# Patient Record
Sex: FEMALE | Race: BLACK OR AFRICAN AMERICAN | NOT HISPANIC OR LATINO | Employment: OTHER | URBAN - METROPOLITAN AREA
[De-identification: names, ages, dates, MRNs, and addresses within clinical notes are randomized per-mention and may not be internally consistent; named-entity substitution may affect disease eponyms.]

---

## 2017-10-29 PROBLEM — L98.9 SKIN LESION OF CHEST WALL: Status: ACTIVE | Noted: 2017-10-29

## 2017-10-29 PROBLEM — E55.9 VITAMIN D DEFICIENCY: Status: ACTIVE | Noted: 2017-10-29

## 2017-10-29 PROBLEM — R07.89 ATYPICAL CHEST PAIN: Status: ACTIVE | Noted: 2017-10-29

## 2017-10-29 PROBLEM — E88.819 INSULIN RESISTANCE: Status: ACTIVE | Noted: 2017-10-29

## 2017-10-29 PROBLEM — R20.0 NUMBNESS OF TOES: Status: ACTIVE | Noted: 2017-10-29

## 2017-10-29 PROBLEM — I10 ESSENTIAL HYPERTENSION, BENIGN: Status: ACTIVE | Noted: 2017-10-29

## 2017-10-29 PROBLEM — E78.5 HYPERLIPIDEMIA: Status: ACTIVE | Noted: 2017-10-29

## 2017-12-07 ENCOUNTER — HOSPITAL ENCOUNTER (OUTPATIENT)
Dept: RADIOLOGY | Facility: HOSPITAL | Age: 62
Discharge: HOME | End: 2017-12-07
Attending: INTERNAL MEDICINE
Payer: COMMERCIAL

## 2017-12-07 ENCOUNTER — OFFICE VISIT (OUTPATIENT)
Dept: PODIATRY | Facility: CLINIC | Age: 62
End: 2017-12-07
Payer: COMMERCIAL

## 2017-12-07 VITALS
BODY MASS INDEX: 33.13 KG/M2 | SYSTOLIC BLOOD PRESSURE: 120 MMHG | HEART RATE: 85 BPM | WEIGHT: 180 LBS | HEIGHT: 62 IN | DIASTOLIC BLOOD PRESSURE: 83 MMHG

## 2017-12-07 DIAGNOSIS — R07.89 ATYPICAL CHEST PAIN: ICD-10-CM

## 2017-12-07 DIAGNOSIS — R20.8 BURNING SENSATION OF TOE AND FOOT: Primary | ICD-10-CM

## 2017-12-07 PROCEDURE — 71100 X-RAY EXAM RIBS UNI 2 VIEWS: CPT | Mod: 26,,, | Performed by: RADIOLOGY

## 2017-12-07 PROCEDURE — 99203 OFFICE O/P NEW LOW 30 MIN: CPT | Mod: S$GLB,,, | Performed by: PODIATRIST

## 2017-12-07 PROCEDURE — 71100 X-RAY EXAM RIBS UNI 2 VIEWS: CPT | Mod: TC,PO

## 2017-12-07 PROCEDURE — 99999 PR PBB SHADOW E&M-NEW PATIENT-LVL III: CPT | Mod: PBBFAC,,, | Performed by: PODIATRIST

## 2017-12-07 NOTE — LETTER
December 7, 2017      Teetee Franco MD  [address] [place] [address]           [facility]  [address] [facility] [place] [address]  Phone: [phone]  Fax: [phone]          Patient: Maude Azevedo   MR Number: [id number]   YOB: 1955   Date of Visit: 12/7/2017       Dear Dr. Teetee Franco:    Thank you for referring Maude Azevedo to me for evaluation. Attached you will find relevant portions of my assessment and plan of care.    If you have questions, please do not hesitate to call me. I look forward to following Maude Azevedo along with you.    Sincerely,    Roge Howell DPM    Enclosure  CC:  No Recipients    If you would like to receive this communication electronically, please contact [email or web address] or [phone] to request more information on [facility] access.    For providers and/or their staff who would like to refer a patient to [facility], please contact us through our one-stop-shop provider referral line, [facility], at [phone].    If you feel you have received this communication in error or would no longer like to receive these types of communications, please e-mail [email or web address]

## 2017-12-07 NOTE — PROGRESS NOTES
Subjective:     Patient ID: Maude Azevedo is a 62 y.o. female.    Chief Complaint: Consult (pcp Leonides 10/23/2017) and Foot Problem (left foot numbness for months)    HPI: This 62 year old female presents today complaining of numbness and tingling of left big toe.The patient states she has numbness and tingling in the big toe for several months. Patient denies any trauma to the toe. Patient admits she did have lower back surgery 5-6 years ago and admits numbness at the inside of the left knee. Patient denies other complaints at this time.      Patient Active Problem List   Diagnosis    Numbness of toes    Atypical chest pain    Skin lesion of chest wall    Insulin resistance    Vitamin D deficiency    Hyperlipidemia    Essential hypertension, benign       Medication List with Changes/Refills   Current Medications    AMLODIPINE (NORVASC) 10 MG TABLET    Take 1 tablet (10 mg total) by mouth once daily.    BUPROPION (WELLBUTRIN XL) 150 MG TB24 TABLET    Take 1 tablet (150 mg total) by mouth once daily. Two tablets daily    CHOLECALCIFEROL, VITAMIN D3, 10,000 UNIT CAP    Take by mouth.    CLONAZEPAM (KLONOPIN) 1 MG TABLET    TK 1/2 TO 1 T PO BID PRA    CYANOCOBALAMIN (VITAMIN B-12) 1000 MCG TABLET    Take 1,000 mcg by mouth.    CYCLOBENZAPRINE (FLEXERIL) 10 MG TABLET    Take 10 mg by mouth once daily.    GABAPENTIN (NEURONTIN) 300 MG CAPSULE    Take 300 mg by mouth.    LEVOTHYROXINE (SYNTHROID) 150 MCG TABLET    Take 1 tablet (150 mcg total) by mouth once daily.    LIRAGLUTIDE 0.6 MG/0.1 ML, 18 MG/3 ML, SUBQ PNIJ (VICTOZA 3-JEN) 0.6 MG/0.1 ML (18 MG/3 ML) PNIJ    Inject 0.6 mg into the skin. 1.8 units sq q day    LISINOPRIL-HYDROCHLOROTHIAZIDE (PRINZIDE,ZESTORETIC) 20-12.5 MG PER TABLET    Take 2 tablets by mouth once daily.    MELOXICAM (MOBIC) 15 MG TABLET    TK 1 T PO D WF    METHOCARBAMOL (ROBAXIN) 500 MG TAB    Take 500 mg by mouth 3 (three) times daily. prn    MV-MN/IRON/FOLIC ACID/HERB 190 (VITAMIN  "D3 COMPLETE ORAL)    Take 10,000 Units by mouth once a week.    NEBIVOLOL (BYSTOLIC) 10 MG TAB    Take 1 tablet (10 mg total) by mouth 2 (two) times daily.    TRAZODONE (DESYREL) 100 MG TABLET    Take 100 mg by mouth once daily.       Review of patient's allergies indicates:   Allergen Reactions    Adhesive      Exfoliative dermatitis    Sulfa (sulfonamide antibiotics)        Past Surgical History:   Procedure Laterality Date    CHOLECYSTECTOMY      FRACTURE SURGERY      HYSTERECTOMY         Family History   Problem Relation Age of Onset    Breast cancer Mother     Heart disease Father     Diabetes Brother     Hypertension Brother        Social History     Social History    Marital status: Single     Spouse name: N/A    Number of children: N/A    Years of education: N/A     Occupational History    Not on file.     Social History Main Topics    Smoking status: Never Smoker    Smokeless tobacco: Never Used    Alcohol use Yes    Drug use: No    Sexual activity: Not on file     Other Topics Concern    Not on file     Social History Narrative    No narrative on file       Vitals:    12/07/17 1030   BP: 120/83   Pulse: 85   Weight: 81.6 kg (180 lb)   Height: 5' 2" (1.575 m)   PainSc: 0-No pain       Hemoglobin A1C   Date Value Ref Range Status   10/24/2017 5.2 4.8 - 5.6 % Final     Comment:              Pre-diabetes: 5.7 - 6.4           Diabetes: >6.4           Glycemic control for adults with diabetes: <7.0     03/17/2010 5.6 4.0 - 6.2 % Final   01/29/2010 5.6 4.0 - 6.2 % Final       Review of Systems   Constitutional: Negative for chills and fever.   Respiratory: Negative for shortness of breath.    Cardiovascular: Negative for chest pain, palpitations, orthopnea, claudication and leg swelling.   Gastrointestinal: Negative for diarrhea, nausea and vomiting.   Musculoskeletal: Negative for joint pain.   Skin: Negative for rash.   Neurological: Negative for dizziness, tingling, sensory change, focal " weakness and weakness.        Numbness of left big toe   Psychiatric/Behavioral: Negative.          Objective:      PHYSICAL EXAM: Apperance: Alert and orient in no distress,well developed, and with good attention to grooming and body habits  Patient presents ambulating in tennis shoes.   Lower Extremity Exam:  VASCULAR: Dorsalis pedis pulses 2/4 bilateral and Posterior Tibial pulses 2/4 bilateral. Capillary fill time <4 seconds bilateral. No edema observed bilateral. Varicosities absent bilateral. Skin temperature of the lower extremities is warm to warm, proximal to distal. Hair growth WNL bilateral.  DERMATOLOGICAL: No skin rashes, subcutaneous nodules, lesions, or ulcers observed bilateral. Nails 1,2,3,4,5 bilateral normal. Webspaces 1,2,3,4 clean, dry and without evidence of break in skin integrity bilateral.   NEUROLOGICAL: Light touch, sharp-dull, proprioception all present and equal bilaterally.  Vibratory sensation diminished at bilateral hallux. Protective sensation absent at left hallux only sites as tested with a [place] 5.07 monofilament.   MUSCULOSKELETAL: Muscle strength is 5/5 for foot inverters, everters, plantarflexors, and dorsiflexors. Muscle tone is normal. (--) pain on palpation or ROM of left hallux.        Assessment:       Encounter Diagnosis   Name Primary?    Burning sensation of toe and foot - Left Foot Yes         Plan:   Burning sensation of toe and foot - Left Foot      I counseled the patient on her conditions, regarding findings of my examination, my impressions, and usual treatment plan.   Counseled patient on her lower back history being the possible cause for abnormal toe sensation.   Discussed with patient treatments for neuropathy consisting of topical or oral medication.  Recommendations given for over-the-counter medicine such as [place] Go[place] and/or Capsaicin.  Counseled patient on daily foot inspections and proper shoe gear.  Patient to return as needed.                      Roge Howell, DPM  [facility]

## 2018-04-16 PROBLEM — K21.9 GERD (GASTROESOPHAGEAL REFLUX DISEASE): Status: ACTIVE | Noted: 2018-04-16

## 2018-04-16 PROBLEM — E89.0 HYPOTHYROIDISM, POSTSURGICAL: Status: ACTIVE | Noted: 2018-04-16

## 2018-04-16 PROBLEM — I10 ESSENTIAL HYPERTENSION: Status: ACTIVE | Noted: 2018-04-16

## 2018-04-16 PROBLEM — K21.9 HIATAL HERNIA WITH GERD: Status: ACTIVE | Noted: 2018-04-16

## 2018-04-16 PROBLEM — K44.9 HIATAL HERNIA WITH GERD: Status: ACTIVE | Noted: 2018-04-16

## 2018-11-14 PROBLEM — R06.02 SOB (SHORTNESS OF BREATH): Status: ACTIVE | Noted: 2018-11-14

## 2018-11-14 PROBLEM — R00.0 TACHYCARDIA: Status: ACTIVE | Noted: 2018-11-14

## 2019-02-14 PROBLEM — Z00.00 ANNUAL PHYSICAL EXAM: Status: ACTIVE | Noted: 2019-02-14

## 2019-02-22 PROBLEM — R31.21 ASYMPTOMATIC MICROSCOPIC HEMATURIA: Status: ACTIVE | Noted: 2019-02-22

## 2019-02-22 PROBLEM — D64.9 ANEMIA: Status: ACTIVE | Noted: 2019-02-22

## 2019-05-16 PROBLEM — M25.562 ACUTE PAIN OF LEFT KNEE: Status: ACTIVE | Noted: 2019-05-16

## 2019-05-16 PROBLEM — F51.01 PRIMARY INSOMNIA: Status: ACTIVE | Noted: 2019-05-16

## 2019-05-20 PROBLEM — Z00.00 ANNUAL PHYSICAL EXAM: Status: RESOLVED | Noted: 2019-02-14 | Resolved: 2019-05-20

## 2019-06-18 PROBLEM — S89.91XA INJURY OF RIGHT LEG: Status: ACTIVE | Noted: 2019-06-18

## 2019-06-23 PROBLEM — J30.2 SEASONAL ALLERGIC RHINITIS: Status: ACTIVE | Noted: 2019-06-23

## 2019-06-23 PROBLEM — L03.115 CELLULITIS OF RIGHT LOWER EXTREMITY: Status: ACTIVE | Noted: 2019-06-23

## 2019-06-23 PROBLEM — M25.521 PAIN IN RIGHT ELBOW: Status: ACTIVE | Noted: 2019-06-23

## 2019-07-12 ENCOUNTER — TELEPHONE (OUTPATIENT)
Dept: RADIOLOGY | Facility: HOSPITAL | Age: 64
End: 2019-07-12

## 2019-07-15 ENCOUNTER — HOSPITAL ENCOUNTER (OUTPATIENT)
Dept: RADIOLOGY | Facility: HOSPITAL | Age: 64
Discharge: HOME | End: 2019-07-15
Attending: INTERNAL MEDICINE
Payer: COMMERCIAL

## 2019-07-15 DIAGNOSIS — M25.562 ACUTE PAIN OF LEFT KNEE: ICD-10-CM

## 2019-07-15 PROCEDURE — 73718 MRI LOWER EXTREMITY W/O DYE: CPT | Mod: TC,RT

## 2019-07-15 PROCEDURE — 73718 MRI TIBIA FIBULA WITHOUT CONTRAST RIGHT: ICD-10-PCS | Mod: 26,RT,, | Performed by: RADIOLOGY

## 2019-07-15 PROCEDURE — 73718 MRI LOWER EXTREMITY W/O DYE: CPT | Mod: 26,RT,, | Performed by: RADIOLOGY

## 2019-10-23 PROBLEM — E83.52 HYPERCALCEMIA: Status: ACTIVE | Noted: 2019-10-23

## 2019-10-23 PROBLEM — E78.2 MIXED HYPERLIPIDEMIA: Status: ACTIVE | Noted: 2017-10-29

## 2019-10-23 PROBLEM — R80.9 PROTEINURIA: Status: ACTIVE | Noted: 2019-10-23

## 2020-02-18 PROBLEM — R06.02 SOB (SHORTNESS OF BREATH): Status: RESOLVED | Noted: 2018-11-14 | Resolved: 2020-02-18

## 2020-02-18 PROBLEM — M25.562 ACUTE PAIN OF LEFT KNEE: Status: RESOLVED | Noted: 2019-05-16 | Resolved: 2020-02-18

## 2020-02-18 PROBLEM — R20.0 NUMBNESS OF TOES: Status: RESOLVED | Noted: 2017-10-29 | Resolved: 2020-02-18

## 2020-02-18 PROBLEM — N39.3 STRESS INCONTINENCE: Status: ACTIVE | Noted: 2020-02-18

## 2020-02-18 PROBLEM — E83.52 HYPERCALCEMIA: Status: RESOLVED | Noted: 2019-10-23 | Resolved: 2020-02-18

## 2020-02-18 PROBLEM — J30.1 SEASONAL ALLERGIC RHINITIS DUE TO POLLEN: Status: ACTIVE | Noted: 2019-06-23

## 2020-02-18 PROBLEM — L98.9 SKIN LESION OF CHEST WALL: Status: RESOLVED | Noted: 2017-10-29 | Resolved: 2020-02-18

## 2020-02-18 PROBLEM — R00.0 TACHYCARDIA: Status: RESOLVED | Noted: 2018-11-14 | Resolved: 2020-02-18

## 2020-02-18 PROBLEM — M25.521 PAIN IN RIGHT ELBOW: Status: RESOLVED | Noted: 2019-06-23 | Resolved: 2020-02-18

## 2020-02-18 PROBLEM — I10 ESSENTIAL HYPERTENSION: Status: RESOLVED | Noted: 2018-04-16 | Resolved: 2020-02-18

## 2020-02-18 PROBLEM — L03.115 CELLULITIS OF RIGHT LOWER EXTREMITY: Status: RESOLVED | Noted: 2019-06-23 | Resolved: 2020-02-18

## 2020-02-18 PROBLEM — S89.91XA INJURY OF RIGHT LEG: Status: RESOLVED | Noted: 2019-06-18 | Resolved: 2020-02-18

## 2020-05-25 PROBLEM — Z00.00 ROUTINE GENERAL MEDICAL EXAMINATION AT A HEALTH CARE FACILITY: Status: RESOLVED | Noted: 2019-02-14 | Resolved: 2020-05-25

## 2021-09-14 PROBLEM — R92.8 ABNORMAL MAMMOGRAM: Status: ACTIVE | Noted: 2021-09-14

## 2021-09-17 ENCOUNTER — TELEPHONE (OUTPATIENT)
Dept: DERMATOLOGY | Facility: CLINIC | Age: 66
End: 2021-09-17

## 2021-10-20 ENCOUNTER — OFFICE VISIT (OUTPATIENT)
Dept: SURGERY | Facility: CLINIC | Age: 66
End: 2021-10-20
Payer: COMMERCIAL

## 2021-10-20 VITALS
HEIGHT: 62 IN | SYSTOLIC BLOOD PRESSURE: 123 MMHG | WEIGHT: 189.38 LBS | DIASTOLIC BLOOD PRESSURE: 74 MMHG | BODY MASS INDEX: 34.85 KG/M2 | HEART RATE: 93 BPM | TEMPERATURE: 98 F

## 2021-10-20 DIAGNOSIS — R92.8 ABNORMAL MAMMOGRAM: ICD-10-CM

## 2021-10-20 PROCEDURE — 3074F SYST BP LT 130 MM HG: CPT | Mod: CPTII,S$GLB,, | Performed by: SURGERY

## 2021-10-20 PROCEDURE — 3078F PR MOST RECENT DIASTOLIC BLOOD PRESSURE < 80 MM HG: ICD-10-PCS | Mod: CPTII,S$GLB,, | Performed by: SURGERY

## 2021-10-20 PROCEDURE — 1159F MED LIST DOCD IN RCRD: CPT | Mod: CPTII,S$GLB,, | Performed by: SURGERY

## 2021-10-20 PROCEDURE — 3074F PR MOST RECENT SYSTOLIC BLOOD PRESSURE < 130 MM HG: ICD-10-PCS | Mod: CPTII,S$GLB,, | Performed by: SURGERY

## 2021-10-20 PROCEDURE — 1160F RVW MEDS BY RX/DR IN RCRD: CPT | Mod: CPTII,S$GLB,, | Performed by: SURGERY

## 2021-10-20 PROCEDURE — 3078F DIAST BP <80 MM HG: CPT | Mod: CPTII,S$GLB,, | Performed by: SURGERY

## 2021-10-20 PROCEDURE — 3008F BODY MASS INDEX DOCD: CPT | Mod: CPTII,S$GLB,, | Performed by: SURGERY

## 2021-10-20 PROCEDURE — 99999 PR PBB SHADOW E&M-EST. PATIENT-LVL V: CPT | Mod: PBBFAC,,, | Performed by: SURGERY

## 2021-10-20 PROCEDURE — 4010F ACE/ARB THERAPY RXD/TAKEN: CPT | Mod: CPTII,S$GLB,, | Performed by: SURGERY

## 2021-10-20 PROCEDURE — 3008F PR BODY MASS INDEX (BMI) DOCUMENTED: ICD-10-PCS | Mod: CPTII,S$GLB,, | Performed by: SURGERY

## 2021-10-20 PROCEDURE — 99204 OFFICE O/P NEW MOD 45 MIN: CPT | Mod: S$GLB,,, | Performed by: SURGERY

## 2021-10-20 PROCEDURE — 1160F PR REVIEW ALL MEDS BY PRESCRIBER/CLIN PHARMACIST DOCUMENTED: ICD-10-PCS | Mod: CPTII,S$GLB,, | Performed by: SURGERY

## 2021-10-20 PROCEDURE — 99204 PR OFFICE/OUTPT VISIT, NEW, LEVL IV, 45-59 MIN: ICD-10-PCS | Mod: S$GLB,,, | Performed by: SURGERY

## 2021-10-20 PROCEDURE — 4010F PR ACE/ARB THEARPY RXD/TAKEN: ICD-10-PCS | Mod: CPTII,S$GLB,, | Performed by: SURGERY

## 2021-10-20 PROCEDURE — 99999 PR PBB SHADOW E&M-EST. PATIENT-LVL V: ICD-10-PCS | Mod: PBBFAC,,, | Performed by: SURGERY

## 2021-10-20 PROCEDURE — 1159F PR MEDICATION LIST DOCUMENTED IN MEDICAL RECORD: ICD-10-PCS | Mod: CPTII,S$GLB,, | Performed by: SURGERY

## 2021-11-03 ENCOUNTER — OFFICE VISIT (OUTPATIENT)
Dept: DERMATOLOGY | Facility: CLINIC | Age: 66
End: 2021-11-03
Payer: COMMERCIAL

## 2021-11-03 DIAGNOSIS — D22.9 NEVUS: ICD-10-CM

## 2021-11-03 DIAGNOSIS — L82.1 DERMATOSIS PAPULOSA NIGRA: ICD-10-CM

## 2021-11-03 DIAGNOSIS — L91.8 INFLAMED SKIN TAG: ICD-10-CM

## 2021-11-03 DIAGNOSIS — L91.8 ACROCHORDON: ICD-10-CM

## 2021-11-03 DIAGNOSIS — L82.1 SEBORRHEIC KERATOSIS: Primary | ICD-10-CM

## 2021-11-03 PROCEDURE — 4010F ACE/ARB THERAPY RXD/TAKEN: CPT | Mod: CPTII,S$GLB,, | Performed by: DERMATOLOGY

## 2021-11-03 PROCEDURE — 1160F RVW MEDS BY RX/DR IN RCRD: CPT | Mod: CPTII,S$GLB,, | Performed by: DERMATOLOGY

## 2021-11-03 PROCEDURE — 99202 OFFICE O/P NEW SF 15 MIN: CPT | Mod: S$GLB,,, | Performed by: DERMATOLOGY

## 2021-11-03 PROCEDURE — 3288F FALL RISK ASSESSMENT DOCD: CPT | Mod: CPTII,S$GLB,, | Performed by: DERMATOLOGY

## 2021-11-03 PROCEDURE — 1159F PR MEDICATION LIST DOCUMENTED IN MEDICAL RECORD: ICD-10-PCS | Mod: CPTII,S$GLB,, | Performed by: DERMATOLOGY

## 2021-11-03 PROCEDURE — 4010F PR ACE/ARB THEARPY RXD/TAKEN: ICD-10-PCS | Mod: CPTII,S$GLB,, | Performed by: DERMATOLOGY

## 2021-11-03 PROCEDURE — 1160F PR REVIEW ALL MEDS BY PRESCRIBER/CLIN PHARMACIST DOCUMENTED: ICD-10-PCS | Mod: CPTII,S$GLB,, | Performed by: DERMATOLOGY

## 2021-11-03 PROCEDURE — 1101F PT FALLS ASSESS-DOCD LE1/YR: CPT | Mod: CPTII,S$GLB,, | Performed by: DERMATOLOGY

## 2021-11-03 PROCEDURE — 99999 PR PBB SHADOW E&M-EST. PATIENT-LVL IV: CPT | Mod: PBBFAC,,, | Performed by: DERMATOLOGY

## 2021-11-03 PROCEDURE — 99202 PR OFFICE/OUTPT VISIT, NEW, LEVL II, 15-29 MIN: ICD-10-PCS | Mod: S$GLB,,, | Performed by: DERMATOLOGY

## 2021-11-03 PROCEDURE — 1101F PR PT FALLS ASSESS DOC 0-1 FALLS W/OUT INJ PAST YR: ICD-10-PCS | Mod: CPTII,S$GLB,, | Performed by: DERMATOLOGY

## 2021-11-03 PROCEDURE — 3288F PR FALLS RISK ASSESSMENT DOCUMENTED: ICD-10-PCS | Mod: CPTII,S$GLB,, | Performed by: DERMATOLOGY

## 2021-11-03 PROCEDURE — 99999 PR PBB SHADOW E&M-EST. PATIENT-LVL IV: ICD-10-PCS | Mod: PBBFAC,,, | Performed by: DERMATOLOGY

## 2021-11-03 PROCEDURE — 1159F MED LIST DOCD IN RCRD: CPT | Mod: CPTII,S$GLB,, | Performed by: DERMATOLOGY

## 2022-04-28 ENCOUNTER — OFFICE VISIT (OUTPATIENT)
Dept: SURGERY | Facility: CLINIC | Age: 67
End: 2022-04-28
Payer: COMMERCIAL

## 2022-04-28 VITALS
HEART RATE: 87 BPM | BODY MASS INDEX: 33.91 KG/M2 | SYSTOLIC BLOOD PRESSURE: 130 MMHG | DIASTOLIC BLOOD PRESSURE: 82 MMHG | TEMPERATURE: 97 F | WEIGHT: 185.44 LBS

## 2022-04-28 DIAGNOSIS — R92.8 ABNORMAL MAMMOGRAM: Primary | ICD-10-CM

## 2022-04-28 PROCEDURE — 1160F RVW MEDS BY RX/DR IN RCRD: CPT | Mod: CPTII,S$GLB,, | Performed by: SURGERY

## 2022-04-28 PROCEDURE — 1126F AMNT PAIN NOTED NONE PRSNT: CPT | Mod: CPTII,S$GLB,, | Performed by: SURGERY

## 2022-04-28 PROCEDURE — 1159F MED LIST DOCD IN RCRD: CPT | Mod: CPTII,S$GLB,, | Performed by: SURGERY

## 2022-04-28 PROCEDURE — 99999 PR PBB SHADOW E&M-EST. PATIENT-LVL V: ICD-10-PCS | Mod: PBBFAC,,, | Performed by: SURGERY

## 2022-04-28 PROCEDURE — 3079F PR MOST RECENT DIASTOLIC BLOOD PRESSURE 80-89 MM HG: ICD-10-PCS | Mod: CPTII,S$GLB,, | Performed by: SURGERY

## 2022-04-28 PROCEDURE — 1126F PR PAIN SEVERITY QUANTIFIED, NO PAIN PRESENT: ICD-10-PCS | Mod: CPTII,S$GLB,, | Performed by: SURGERY

## 2022-04-28 PROCEDURE — 99214 PR OFFICE/OUTPT VISIT, EST, LEVL IV, 30-39 MIN: ICD-10-PCS | Mod: S$GLB,,, | Performed by: SURGERY

## 2022-04-28 PROCEDURE — 3008F PR BODY MASS INDEX (BMI) DOCUMENTED: ICD-10-PCS | Mod: CPTII,S$GLB,, | Performed by: SURGERY

## 2022-04-28 PROCEDURE — 1160F PR REVIEW ALL MEDS BY PRESCRIBER/CLIN PHARMACIST DOCUMENTED: ICD-10-PCS | Mod: CPTII,S$GLB,, | Performed by: SURGERY

## 2022-04-28 PROCEDURE — 99999 PR PBB SHADOW E&M-EST. PATIENT-LVL V: CPT | Mod: PBBFAC,,, | Performed by: SURGERY

## 2022-04-28 PROCEDURE — 3075F PR MOST RECENT SYSTOLIC BLOOD PRESS GE 130-139MM HG: ICD-10-PCS | Mod: CPTII,S$GLB,, | Performed by: SURGERY

## 2022-04-28 PROCEDURE — 3079F DIAST BP 80-89 MM HG: CPT | Mod: CPTII,S$GLB,, | Performed by: SURGERY

## 2022-04-28 PROCEDURE — 1159F PR MEDICATION LIST DOCUMENTED IN MEDICAL RECORD: ICD-10-PCS | Mod: CPTII,S$GLB,, | Performed by: SURGERY

## 2022-04-28 PROCEDURE — 3075F SYST BP GE 130 - 139MM HG: CPT | Mod: CPTII,S$GLB,, | Performed by: SURGERY

## 2022-04-28 PROCEDURE — 3008F BODY MASS INDEX DOCD: CPT | Mod: CPTII,S$GLB,, | Performed by: SURGERY

## 2022-04-28 PROCEDURE — 99214 OFFICE O/P EST MOD 30 MIN: CPT | Mod: S$GLB,,, | Performed by: SURGERY

## 2022-05-04 ENCOUNTER — HOSPITAL ENCOUNTER (OUTPATIENT)
Dept: RADIOLOGY | Facility: HOSPITAL | Age: 67
Discharge: HOME | End: 2022-05-04
Attending: SURGERY
Payer: COMMERCIAL

## 2022-05-04 ENCOUNTER — HOSPITAL ENCOUNTER (OUTPATIENT)
Dept: RADIOLOGY | Facility: HOSPITAL | Age: 67
Discharge: HOME | End: 2022-05-04
Payer: COMMERCIAL

## 2022-05-04 DIAGNOSIS — R92.8 ABNORMAL MAMMOGRAM: ICD-10-CM

## 2022-05-04 PROCEDURE — 77061 MAMMO DIGITAL DIAGNOSTIC LEFT WITH TOMO: ICD-10-PCS | Mod: 26,LT,, | Performed by: RADIOLOGY

## 2022-05-04 PROCEDURE — 77065 DX MAMMO INCL CAD UNI: CPT | Mod: 26,LT,, | Performed by: RADIOLOGY

## 2022-05-04 PROCEDURE — 77061 BREAST TOMOSYNTHESIS UNI: CPT | Mod: 26,LT,, | Performed by: RADIOLOGY

## 2022-05-04 PROCEDURE — 77065 MAMMO DIGITAL DIAGNOSTIC LEFT WITH TOMO: ICD-10-PCS | Mod: 26,LT,, | Performed by: RADIOLOGY

## 2022-05-04 PROCEDURE — 77065 DX MAMMO INCL CAD UNI: CPT | Mod: TC,LT

## 2022-05-04 NOTE — PROGRESS NOTES
History & Physical    SUBJECTIVE:     History of Present Illness:  Patient is a 66 y.o. female presents for 6 month interval left breast exam and breast imaging.  In the interim she denies any new breast masses or breast changes.      Initially referred for second opinion of recent mammogram/biopsy. She recently underwent imaging and was noted to have abnormal mammogram/ultrasound of left breast. She was noted to have mass in lower outer region and axilla. She underwent core biopsy of these and pathology showed benign intramammary lymph node and benign axillary lymph node.     No chief complaint on file.      Review of patient's allergies indicates:   Allergen Reactions    Ace inhibitors     Adhesive      Exfoliative dermatitis    Levothyroxine      Branded synthroid only    Sulfa (sulfonamide antibiotics) Swelling       Current Outpatient Medications   Medication Sig Dispense Refill    albuterol (PROVENTIL/VENTOLIN HFA) 90 mcg/actuation inhaler Inhale 2 puffs into the lungs.      amLODIPine (NORVASC) 10 MG tablet TAKE 1 TABLET(10 MG) BY MOUTH EVERY DAY 90 tablet 1    aspirin (ECOTRIN) 81 MG EC tablet Take 81 mg by mouth.      buPROPion (WELLBUTRIN XL) 150 MG TB24 tablet TAKE 2 TABLETS BY MOUTH EVERY  tablet 0    cholecalciferol, vitamin D3, (DIALYVITE VITAMIN D3 MAX) 1,250 mcg (50,000 unit) Tab Take 1 tablet by mouth once a week. 12 tablet 1    clonazePAM (KLONOPIN) 1 MG tablet       cloNIDine (CATAPRES) 0.1 MG tablet TAKE 1 TABLET(0.1 MG) BY MOUTH EVERY EVENING 90 tablet 1    FREESTYLE LITE STRIPS Strp USE TO TEST BLOOD GLUCOSE EVERY DAY 50 strip 2    liraglutide 0.6 mg/0.1 mL, 18 mg/3 mL, subq PNIJ (VICTOZA 3-JEN) 0.6 mg/0.1 mL (18 mg/3 mL) PnIj pen INSTILL 1.8 UNITS BY SUBCUTEANOUS ROUTE DAILY 9 mL 1    lisinopriL (PRINIVIL,ZESTRIL) 20 MG tablet Take by mouth.      losartan-hydrochlorothiazide 100-25 mg (HYZAAR) 100-25 mg per tablet Take 1 tablet by mouth once daily. 90 tablet 1     meloxicam (MOBIC) 7.5 MG tablet Take 1 tablet (7.5 mg total) by mouth once daily. 30 tablet 2    montelukast (SINGULAIR) 10 mg tablet TAKE 1 TABLET(10 MG) BY MOUTH EVERY EVENING 30 tablet 11    pen needle, diabetic (PEN NEEDLE) 29 gauge Ndle To inj once daily 100 each 1    SYNTHROID 175 mcg tablet TAKE 1 TABLET(175 MCG) BY MOUTH BEFORE BREAKFAST 30 tablet 11    SYNTHROID 175 mcg tablet TAKE 1 TABLET(175 MCG) BY MOUTH BEFORE BREAKFAST 30 tablet 11    traZODone (DESYREL) 100 MG tablet TAKE 2 TABLETS BY MOUTH EVERY NIGHT AS NEEDED      ALLEGRA-D 24 HOUR 180-240 mg per 24 hr tablet Take by mouth.      ALPRAZolam (XANAX) 0.5 MG tablet Take by mouth.      atorvastatin (LIPITOR) 40 MG tablet Take by mouth.      methocarbamoL (ROBAXIN) 750 MG Tab Take 750 mg by mouth.      mupirocin (BACTROBAN) 2 % ointment Apply to affected area on R lower leg 3 times a day as needed (Patient not taking: Reported on 4/28/2022) 15 g 1    pantoprazole (PROTONIX) 40 MG tablet Take by mouth.      rosuvastatin (CRESTOR) 5 MG tablet Take 5 mg by mouth once daily.      traMADoL (ULTRAM) 50 mg tablet        No current facility-administered medications for this visit.       Past Medical History:   Diagnosis Date    Adult general medical examination 12/23/2016    Anxiety     Congestive heart failure     Coronary atherosclerosis     Depression     GERD (gastroesophageal reflux disease)     Hyperlipidemia     Hypertension     Hypothyroidism     Insomnia     Long term current use of therapeutic drug     Menopause     Metabolic syndrome X     Sleep apnea     Vitamin D deficiency      Past Surgical History:   Procedure Laterality Date    CHOLECYSTECTOMY      HYSTERECTOMY      SPINE SURGERY      THYROIDECTOMY      post thyroid cancer     Family History   Problem Relation Age of Onset    Breast cancer Mother     Heart disease Father     Diabetes Brother     Hypertension Brother      Social History     Tobacco Use     Smoking status: Never Smoker    Smokeless tobacco: Never Used   Substance Use Topics    Alcohol use: Yes    Drug use: No        Review of Systems:  Review of Systems   Constitutional: Negative for activity change, appetite change, chills, diaphoresis, fatigue, fever and unexpected weight change.   HENT: Negative for congestion, dental problem, hearing loss, rhinorrhea, sore throat and trouble swallowing.    Eyes: Negative for discharge and visual disturbance.   Respiratory: Negative for cough, chest tightness, shortness of breath and wheezing.    Cardiovascular: Negative for chest pain, palpitations and leg swelling.   Gastrointestinal: Negative for abdominal distention, abdominal pain, blood in stool, constipation, diarrhea, nausea and vomiting.   Endocrine: Negative for cold intolerance, heat intolerance, polydipsia, polyphagia and polyuria.   Genitourinary: Negative for difficulty urinating, dysuria, frequency, hematuria, menstrual problem and urgency.   Musculoskeletal: Negative for arthralgias, gait problem, joint swelling, myalgias and neck pain.   Skin: Negative for color change, pallor, rash and wound.   Neurological: Negative for dizziness, syncope, weakness, light-headedness, numbness and headaches.   Hematological: Negative for adenopathy. Does not bruise/bleed easily.   Psychiatric/Behavioral: Negative for confusion, decreased concentration, dysphoric mood and sleep disturbance. The patient is not nervous/anxious.        OBJECTIVE:     Vital Signs (Most Recent)  Temp: 96.7 °F (35.9 °C) (04/28/22 1519)  Pulse: 87 (04/28/22 1519)  BP: 130/82 (04/28/22 1519)     84.1 kg (185 lb 6.5 oz)     Physical Exam:  Physical Exam  Vitals reviewed.   Constitutional:       General: She is not in acute distress.     Appearance: She is well-developed. She is not diaphoretic.   HENT:      Head: Normocephalic and atraumatic.      Right Ear: External ear normal.      Left Ear: External ear normal.   Eyes:      General: No  scleral icterus.     Conjunctiva/sclera: Conjunctivae normal.      Pupils: Pupils are equal, round, and reactive to light.   Neck:      Thyroid: No thyromegaly.      Trachea: No tracheal deviation.   Cardiovascular:      Rate and Rhythm: Normal rate and regular rhythm.      Heart sounds: Normal heart sounds. No murmur heard.    No friction rub. No gallop.   Pulmonary:      Effort: Pulmonary effort is normal. No respiratory distress.      Breath sounds: Normal breath sounds. No wheezing or rales.   Chest:      Chest wall: No tenderness.   Breasts:      Right: No inverted nipple, mass, nipple discharge, skin change or tenderness.      Left: No inverted nipple, mass, nipple discharge, skin change or tenderness.       Abdominal:      General: Bowel sounds are normal. There is no distension.      Palpations: Abdomen is soft.      Tenderness: There is no abdominal tenderness.      Hernia: No hernia is present.   Musculoskeletal:         General: No tenderness or deformity. Normal range of motion.      Cervical back: Normal range of motion and neck supple.   Lymphadenopathy:      Cervical: No cervical adenopathy.   Skin:     General: Skin is warm and dry.      Coloration: Skin is not pale.      Findings: No erythema or rash.   Neurological:      Mental Status: She is alert and oriented to person, place, and time.   Psychiatric:         Behavior: Behavior normal.         Thought Content: Thought content normal.         Judgment: Judgment normal.                       ASSESSMENT/PLAN:     65 y/o female with previously abnormal left breast mammogram and subsequent biopsy, normal breast exam today    PLAN:Plan     Schedule for 6 month interval breast imaging  Call patient with results  Breast exam normal today    Hypertension stable/monitor/continue medical management with antihypertensives  Hyperlipidemia stable/dietary modifications/statin therapy  Hypothyroidism continue medical management with thyroid replacement  medication

## 2022-05-23 ENCOUNTER — PATIENT MESSAGE (OUTPATIENT)
Dept: SURGERY | Facility: HOSPITAL | Age: 67
End: 2022-05-23
Payer: COMMERCIAL

## 2023-06-02 PROBLEM — N32.81 OAB (OVERACTIVE BLADDER): Status: ACTIVE | Noted: 2023-02-16

## 2024-06-06 ENCOUNTER — OFFICE VISIT (OUTPATIENT)
Dept: DERMATOLOGY | Facility: CLINIC | Age: 69
End: 2024-06-06
Payer: COMMERCIAL

## 2024-06-06 VITALS — WEIGHT: 186.75 LBS | BODY MASS INDEX: 34.37 KG/M2 | HEIGHT: 62 IN

## 2024-06-06 DIAGNOSIS — D22.9 NEVUS: ICD-10-CM

## 2024-06-06 DIAGNOSIS — L82.1 SEBORRHEIC KERATOSES: Primary | ICD-10-CM

## 2024-06-06 DIAGNOSIS — L82.0 INFLAMED SEBORRHEIC KERATOSIS: ICD-10-CM

## 2024-06-06 PROCEDURE — 3060F POS MICROALBUMINURIA REV: CPT | Mod: CPTII,S$GLB,, | Performed by: STUDENT IN AN ORGANIZED HEALTH CARE EDUCATION/TRAINING PROGRAM

## 2024-06-06 PROCEDURE — 3288F FALL RISK ASSESSMENT DOCD: CPT | Mod: CPTII,S$GLB,, | Performed by: STUDENT IN AN ORGANIZED HEALTH CARE EDUCATION/TRAINING PROGRAM

## 2024-06-06 PROCEDURE — 3044F HG A1C LEVEL LT 7.0%: CPT | Mod: CPTII,S$GLB,, | Performed by: STUDENT IN AN ORGANIZED HEALTH CARE EDUCATION/TRAINING PROGRAM

## 2024-06-06 PROCEDURE — 99999 PR PBB SHADOW E&M-EST. PATIENT-LVL V: CPT | Mod: PBBFAC,,, | Performed by: STUDENT IN AN ORGANIZED HEALTH CARE EDUCATION/TRAINING PROGRAM

## 2024-06-06 PROCEDURE — 3008F BODY MASS INDEX DOCD: CPT | Mod: CPTII,S$GLB,, | Performed by: STUDENT IN AN ORGANIZED HEALTH CARE EDUCATION/TRAINING PROGRAM

## 2024-06-06 PROCEDURE — 1101F PT FALLS ASSESS-DOCD LE1/YR: CPT | Mod: CPTII,S$GLB,, | Performed by: STUDENT IN AN ORGANIZED HEALTH CARE EDUCATION/TRAINING PROGRAM

## 2024-06-06 PROCEDURE — 1126F AMNT PAIN NOTED NONE PRSNT: CPT | Mod: CPTII,S$GLB,, | Performed by: STUDENT IN AN ORGANIZED HEALTH CARE EDUCATION/TRAINING PROGRAM

## 2024-06-06 PROCEDURE — 1160F RVW MEDS BY RX/DR IN RCRD: CPT | Mod: CPTII,S$GLB,, | Performed by: STUDENT IN AN ORGANIZED HEALTH CARE EDUCATION/TRAINING PROGRAM

## 2024-06-06 PROCEDURE — 1159F MED LIST DOCD IN RCRD: CPT | Mod: CPTII,S$GLB,, | Performed by: STUDENT IN AN ORGANIZED HEALTH CARE EDUCATION/TRAINING PROGRAM

## 2024-06-06 PROCEDURE — 17110 DESTRUCTION B9 LES UP TO 14: CPT | Mod: S$GLB,,, | Performed by: STUDENT IN AN ORGANIZED HEALTH CARE EDUCATION/TRAINING PROGRAM

## 2024-06-06 PROCEDURE — 3066F NEPHROPATHY DOC TX: CPT | Mod: CPTII,S$GLB,, | Performed by: STUDENT IN AN ORGANIZED HEALTH CARE EDUCATION/TRAINING PROGRAM

## 2024-06-06 PROCEDURE — 99213 OFFICE O/P EST LOW 20 MIN: CPT | Mod: 25,S$GLB,, | Performed by: STUDENT IN AN ORGANIZED HEALTH CARE EDUCATION/TRAINING PROGRAM

## 2024-06-06 NOTE — PROGRESS NOTES
Subjective:       Patient ID:  Maude Azevedo is a 68 y.o. female who presents for   Chief Complaint   Patient presents with    Mole     History of Present Illness: The patient presents with chief complaint of an irritated growth on the skin.  Location: left chest, right abdomen/flank area  Duration: both present for several months  Signs/Symptoms: reports on both sides have a brownish, crusted, itchy and irritated growth  Prior treatments: none      Mole        Review of Systems   Constitutional:  Negative for fever and chills.   Skin:  Negative for itching, rash and dry skin.        Objective:    Physical Exam   Constitutional: She appears well-developed and well-nourished. No distress.   Neurological: She is alert and oriented to person, place, and time. She is not disoriented.   Psychiatric: She has a normal mood and affect.   Skin:   Areas Examined (abnormalities noted in diagram):   Head / Face Inspection Performed  Neck Inspection Performed  Chest / Axilla Inspection Performed  Abdomen Inspection Performed  Back Inspection Performed  RUE Inspected              Diagram Legend     Erythematous scaling macule/papule c/w actinic keratosis       Vascular papule c/w angioma      Pigmented verrucoid papule/plaque c/w seborrheic keratosis      Yellow umbilicated papule c/w sebaceous hyperplasia      Irregularly shaped tan macule c/w lentigo     1-2 mm smooth white papules consistent with Milia      Movable subcutaneous cyst with punctum c/w epidermal inclusion cyst      Subcutaneous movable cyst c/w pilar cyst      Firm pink to brown papule c/w dermatofibroma      Pedunculated fleshy papule(s) c/w skin tag(s)      Evenly pigmented macule c/w junctional nevus     Mildly variegated pigmented, slightly irregular-bordered macule c/w mildly atypical nevus      Flesh colored to evenly pigmented papule c/w intradermal nevus       Pink pearly papule/plaque c/w basal cell carcinoma      Erythematous hyperkeratotic  cursted plaque c/w SCC      Surgical scar with no sign of skin cancer recurrence      Open and closed comedones      Inflammatory papules and pustules      Verrucoid papule consistent consistent with wart     Erythematous eczematous patches and plaques     Dystrophic onycholytic nail with subungual debris c/w onychomycosis     Umbilicated papule    Erythematous-base heme-crusted tan verrucoid plaque consistent with inflamed seborrheic keratosis     Erythematous Silvery Scaling Plaque c/w Psoriasis     See annotation      Assessment / Plan:        Seborrheic keratoses  These are benign inherited growths without a malignant potential. Reassurance given to patient. No treatment is necessary.     Inflamed seborrheic keratosis  Cryosurgery procedure note:    Verbal consent from the patient is obtained including, but not limited to, risk of hypopigmentation/hyperpigmentation, scar, recurrence of lesion. Liquid nitrogen cryosurgery is applied to 2 lesions to produce a freeze injury. The patient is aware that blisters may form and is instructed on wound care with gentle cleansing and use of vaseline ointment to keep moist until healed. The patient is supplied a handout on cryosurgery and is instructed to call if lesions do not completely resolve.             Follow up if symptoms worsen or fail to improve.

## 2025-01-10 ENCOUNTER — HOSPITAL ENCOUNTER (OUTPATIENT)
Dept: RADIOLOGY | Facility: HOSPITAL | Age: 70
Discharge: HOME | End: 2025-01-10
Attending: INTERNAL MEDICINE
Payer: COMMERCIAL

## 2025-01-10 DIAGNOSIS — M79.605 CHRONIC PAIN OF LEFT LOWER EXTREMITY: ICD-10-CM

## 2025-01-10 DIAGNOSIS — G89.29 CHRONIC PAIN OF LEFT LOWER EXTREMITY: ICD-10-CM

## 2025-01-10 PROBLEM — E66.01 SEVERE OBESITY (BMI 35.0-39.9) WITH COMORBIDITY: Status: ACTIVE | Noted: 2025-01-10

## 2025-01-10 PROCEDURE — 93971 EXTREMITY STUDY: CPT | Mod: 26,LT,, | Performed by: RADIOLOGY

## 2025-01-10 PROCEDURE — 93971 EXTREMITY STUDY: CPT | Mod: TC,LT

## 2025-01-18 PROBLEM — M17.12 PRIMARY OSTEOARTHRITIS OF LEFT KNEE: Status: ACTIVE | Noted: 2025-01-18

## 2025-01-24 PROBLEM — N60.12 DIFFUSE CYSTIC MASTOPATHY OF LEFT BREAST: Status: ACTIVE | Noted: 2025-01-24

## 2025-01-24 PROBLEM — N64.52 DISCHARGE FROM LEFT NIPPLE: Status: ACTIVE | Noted: 2025-01-24

## 2025-01-24 NOTE — ASSESSMENT & PLAN NOTE
Her discharge is actually clear. I will obtain a left us and galactogram then  make further recommendations. She understands the need to obtain a galactogram so that the area of concern (probable papilloma) can be localized. She understands that there is about a 90% chance that her discharge is being caused by a benign etiology. She also understands that there is a 10% chance that this could be a cancer causing her discharge. She understands that SubAreolar Resection (ductal excision) may be necessary. R/B/I and alternatives were discussed with her. I will obtain an ultrasound and a galactogram then make further recommendations pending her finalized imaging results.

## 2025-01-24 NOTE — PROGRESS NOTES
[facility]  MD Lashae Madden, NP-C        Date of Service: 1/27/2025    Chief Complaint:   Maude Azevedo is a 69 y.o. female presenting today due to a clear left nipple discharge. She first noticed this discharge  around August. She reports this discharge is spontaneous.  Her Prolactin level and thyroid level were normal 1/10/2025. Her BUN/ Creat was normal 10/9/2024.    History of Present Illness:   Mrs. Maude Azevedo presents on January 27, 2025 due to a clear left nipple discharge.  She reports no abnormal findings on her last breast imaging.  MD:::Teetee Franco MD    Past Medical History:   Diagnosis Date    Adult general medical examination 12/23/2016    Anxiety     Congestive heart failure     Coronary atherosclerosis     Depression     Diffuse cystic mastopathy of left breast     Discharge from left nipple 01/24/2025    GERD (gastroesophageal reflux disease)     Hyperlipidemia     Hypertension     Hypothyroidism     Insomnia     Long term current use of therapeutic drug     Mass of lower outer quadrant of left breast     b-9 intramammary LN on core 10/6/2021    Menopause     Metabolic syndrome X     Sleep apnea     Vitamin D deficiency       Past Surgical History:   Procedure Laterality Date    CHOLECYSTECTOMY      HYSTERECTOMY      left breast ultrasound guuided biopsy 3- 4 o'clock mass and left axillary LN 10/6/2021      SPINE SURGERY      THYROIDECTOMY      post thyroid cancer        Current Outpatient Medications:     albuterol (PROVENTIL/VENTOLIN HFA) 90 mcg/actuation inhaler, Inhale 2 puffs into the lungs every 6 (six) hours as needed for Wheezing., Disp: 18 g, Rfl: 2    amLODIPine (NORVASC) 10 MG tablet, Take 1 tablet (10 mg total) by mouth once daily., Disp: 90 tablet, Rfl: 1    aspirin (ECOTRIN) 81 MG EC tablet, Take 81 mg by mouth., Disp: , Rfl:     cholecalciferol, vitamin D3, (VITAMIN D3) 250 mcg (10,000 unit) Cap capsule,  Take 2 capsules (20,000 Units total) by mouth once a week., Disp: 24 capsule, Rfl: 1    cloNIDine (CATAPRES) 0.1 MG tablet, , Disp: , Rfl:     empagliflozin (JARDIANCE) 10 mg tablet, Take 1 tablet (10 mg total) by mouth once daily., Disp: 90 tablet, Rfl: 1    hydrOXYzine pamoate (VISTARIL) 25 MG Cap, TAKE 1 TO 2 CAPSULES BY MOUTH TWICE DAILY AS NEEDED FOR ANXIETY, Disp: , Rfl:     loratadine (CLARITIN) 10 mg tablet, Take 1 tablet (10 mg total) by mouth once daily., Disp: 90 tablet, Rfl: 1    meloxicam (MOBIC) 15 MG tablet, Take 1 tablet (15 mg total) by mouth daily as needed for Pain., Disp: 30 tablet, Rfl: 1    montelukast (SINGULAIR) 10 mg tablet, TAKE 1 TABLET(10 MG) BY MOUTH EVERY EVENING, Disp: 90 tablet, Rfl: 1    mupirocin (BACTROBAN) 2 % ointment, Apply topically 3 (three) times daily., Disp: 30 g, Rfl: 2    nystatin (MYCOSTATIN) powder, Apply topically 4 (four) times daily., Disp: 60 g, Rfl: 2    pantoprazole (PROTONIX) 40 MG tablet, Take 1 tablet (40 mg total) by mouth once daily., Disp: 90 tablet, Rfl: 1    sertraline (ZOLOFT) 25 MG tablet, Take 25 mg by mouth., Disp: , Rfl:     SYNTHROID 137 mcg Tab tablet, Take 1 tablet (137 mcg total) by mouth before breakfast., Disp: 90 tablet, Rfl: 1    traZODone (DESYREL) 100 MG tablet, TAKE 2 TABLETS BY MOUTH EVERY NIGHT AS NEEDED, Disp: 90 tablet, Rfl: 1    BLACK COHOSH ORAL, Take 40 mg by mouth as needed., Disp: , Rfl:     ciclopirox (PENLAC) 8 % Soln, Apply topically nightly. (Patient not taking: Reported on 1/27/2025), Disp: 6.6 mL, Rfl: 2    multivitamin with minerals tablet, Take 1 tablet by mouth once daily., Disp: , Rfl:    Review of patient's allergies indicates:   Allergen Reactions    Ace inhibitors     Adhesive      Exfoliative dermatitis    Levothyroxine      Branded synthroid only    Sulfa (sulfonamide antibiotics) Swelling      Social History     Tobacco Use    Smoking status: Never    Smokeless tobacco: Never   Substance Use Topics    Alcohol use:  Yes      Family History   Problem Relation Name Age of Onset    Breast cancer Mother  78    Heart disease Father      Diabetes Brother      Hypertension Brother      Cancer Daughter      Ovarian cancer Neg Hx          Review of Systems   Integumentary:  Positive for breast discharge. Negative for color change, rash, mole/lesion, breast mass and breast tenderness.   Breast: Negative for mass and tenderness       Physical Exam   Constitutional: She appears well-developed. She is cooperative.   HENT:   Head: Normocephalic.   Cardiovascular:  Normal rate and regular rhythm.            Pulmonary/Chest: She exhibits no tenderness and no bony tenderness. Right breast exhibits no mass, no nipple discharge, no skin change and no tenderness. Left breast exhibits nipple discharge (clear discharge noted). Left breast exhibits no mass, no skin change and no tenderness.       Abdominal: Soft. Normal appearance.   Musculoskeletal: Lymphadenopathy:      Upper Body:      Right upper body: No supraclavicular or axillary adenopathy.      Left upper body: No supraclavicular or axillary adenopathy.     Neurological: She is alert.   Skin: No rash noted.          MAMMOGRAM REPORT: 11/8/2024 Right additional views:  The breast tissue is predominantly fatty. Additional digital mammographic images of the right breast reveal no evidence of mass or architectural distortion. Area of asymmetry within the upper right breast demonstrated on the screening MLO view did not persist on the multiple additional views.  Screening 10/21/2024-  There are scattered fibroglandular elements noted. An asymmetry is seen in the upper right breast at posterior depth on MLO view only. No change of   the left breast. Impression: Right breast asymmetry for which further mammographic evaluation to   include true lateral and spot compression views are warranted. BIRADS 0: Incomplete: Need additional imaging evaluation     ULTRASOUND REPORT:  Will obtain      Galactogram: Left- Will obtain today    NOTE:::We viewed her films together at today's visit.  We discussed the multiple views obtained and the important findings.  Even benign changes were mentioned and her questions were answered.  She knows that she may receive a formal letter or report from the Radiologist.  She is to contact us if she has questions.    ASSESSMENT and PLAN OF CARE     1. Discharge from left nipple  Assessment & Plan:  Her discharge is actually clear. I will obtain a left us and galactogram then  make further recommendations. She understands the need to obtain a galactogram so that the area of concern (probable papilloma) can be localized. She understands that there is about a 90% chance that her discharge is being caused by a benign etiology. She also understands that there is a 10% chance that this could be a cancer causing her discharge. She understands that SubAreolar Resection (ductal excision) may be necessary. R/B/I and alternatives were discussed with her. I will obtain an ultrasound and a galactogram then make further recommendations pending her finalized imaging results.       2. Mass of lower outer quadrant of left breast  Assessment & Plan:  We reviewed our findings today and her questions were answered.  She understands that her imaging and exams have remained stable (and show nothing concerning).  She is comfortable being followed in a conservative fashion.      She understands the importance of monthly self-breast examination and knows to report any and all changes as they occur.    NOTE:::We viewed her films together at today's visit.  We discussed the multiple views obtained and the important findings.  Even benign changes were mentioned and her questions were answered.  She is to contact us if she has questions.        3. Diffuse cystic mastopathy of left breast  Assessment & Plan:  We discussed our Fibrocystic Mastopathy Protocol in detail. She should take Vitamin E 800 IU  everyday x 3 months or until non-tender then can stop Vitamin E vs. continue daily at 400 IU.  The use of ice packs or warms soaks to tender area of the breast may also be of some benefit.  If warm soaks help her tenderness - She can use Aspercreme (unless allergic to Aspirin) on the affected area.  Ibuprofen (if no contraindications) at 800 mg three times per day for 5 days can also relieve many symptoms associated with swollen or inflamed tissue.  She can repeat Ibuprofen for 5 days, but then should be off for 5 days as it may cause gastric upset.  It is a good idea to wear a tight bra during the day and night to minimize movement of the tender area (Sports Bras work well).  Evening Primrose Oil can be bought over the counter and used at a dose of 3000 mg per day to help with any breast pain/tenderness not improved by implementing the above measures.        4. Galactorrhea on left side  -     Ambulatory referral/consult to Breast Surgery    Medical Decision Making: It is my impression that this patient suffers all conditions contained in this medical document.  Each of these conditions did affect our plan of care and my medical decision making today.  It is my opinion that the medical decision making concerning this patient was of moderate difficulty based on the aforementioned conditions.  Any further recommendations will be communicated to the patient by me.  I have reviewed and verified her allergies, list of medications, medical and surgical histories, social history, and a pertinent review of symptoms.     Follow up:  I will phone her with the results of her additional imaging and make recommendations as needed.    Addendum 1/29/2025 1811: Left Ultrasound- FINDINGS: Careful scanning throughout the left retroareolar region performed. Smooth-bordered benign-appearing cyst is noted within the deep portion of the nipple itself measuring 0.4 x 0.3 cm, no intrinsic blood flow with color Doppler. Benign-appearing  minimal retroareolar ductal ectasia. No intraductal mass. No suspicious retroareolar mass. IMPRESSION: Small benign-appearing cyst within the deep portion of the left nipple itself. Benign-appearing retroareolar ductal ectasia, no suspicious sonographic findings. The patient was also scheduled for a left galactogram   which is to follow.Recommend clinical management of the reported nipple discharge. BIRADS 2: Benign  RECOMMENDATION: Clinical Exam.    Left Galactogram: Multiple 1-2 mm filling defects identified within an opacified duct located 1.5 cm directly deep to the nipple, best demonstrated on the MLO spot compression view. Filling defects extend over an approximate 7 mm length. IMPRESSION: Multiple small filling defects identified within an opacified duct   located 1.5 cm directly deep to the nipple, possible papillomatosis, intraductal  malignancy not excluded.    We discussed the results and the need for LSAR after galactogram localization. I will send a referral to Dr. Stanley for further recommendations and treatment.  She understands and agrees with this plan.

## 2025-01-27 ENCOUNTER — OFFICE VISIT (OUTPATIENT)
Dept: SURGERY | Facility: CLINIC | Age: 70
End: 2025-01-27
Payer: COMMERCIAL

## 2025-01-27 VITALS — WEIGHT: 194.25 LBS | BODY MASS INDEX: 35.75 KG/M2 | HEIGHT: 62 IN

## 2025-01-27 DIAGNOSIS — N64.3 GALACTORRHEA ON LEFT SIDE: ICD-10-CM

## 2025-01-27 DIAGNOSIS — N63.23 MASS OF LOWER OUTER QUADRANT OF LEFT BREAST: ICD-10-CM

## 2025-01-27 DIAGNOSIS — N64.52 DISCHARGE FROM LEFT NIPPLE: Primary | ICD-10-CM

## 2025-01-27 DIAGNOSIS — N60.12 DIFFUSE CYSTIC MASTOPATHY OF LEFT BREAST: ICD-10-CM

## 2025-01-27 PROCEDURE — 99999 PR PBB SHADOW E&M-EST. PATIENT-LVL IV: CPT | Mod: PBBFAC,,, | Performed by: NURSE PRACTITIONER

## 2025-01-27 PROCEDURE — 1160F RVW MEDS BY RX/DR IN RCRD: CPT | Mod: CPTII,S$GLB,, | Performed by: NURSE PRACTITIONER

## 2025-01-27 PROCEDURE — 3008F BODY MASS INDEX DOCD: CPT | Mod: CPTII,S$GLB,, | Performed by: NURSE PRACTITIONER

## 2025-01-27 PROCEDURE — 99214 OFFICE O/P EST MOD 30 MIN: CPT | Mod: S$GLB,,, | Performed by: NURSE PRACTITIONER

## 2025-01-27 PROCEDURE — 1159F MED LIST DOCD IN RCRD: CPT | Mod: CPTII,S$GLB,, | Performed by: NURSE PRACTITIONER

## 2025-01-27 PROCEDURE — 1126F AMNT PAIN NOTED NONE PRSNT: CPT | Mod: CPTII,S$GLB,, | Performed by: NURSE PRACTITIONER

## 2025-01-29 ENCOUNTER — TELEPHONE (OUTPATIENT)
Dept: SURGERY | Facility: CLINIC | Age: 70
End: 2025-01-29
Payer: COMMERCIAL

## 2025-01-30 NOTE — TELEPHONE ENCOUNTER
I spoke to Mrs. Santoro and updated her on the galactogram and ultrasound results. I explained the next steps. She's comfortable with our plan. I will send a referral to Dr. Stanley. She will notify my office if she has not heard from Dr. Stanley's office and we will reach out to them for her.

## 2025-06-30 ENCOUNTER — OFFICE VISIT (OUTPATIENT)
Dept: SPORTS MEDICINE | Facility: CLINIC | Age: 70
End: 2025-06-30
Payer: COMMERCIAL

## 2025-06-30 ENCOUNTER — HOSPITAL ENCOUNTER (OUTPATIENT)
Dept: RADIOLOGY | Facility: HOSPITAL | Age: 70
Discharge: HOME | End: 2025-06-30
Attending: PHYSICIAN ASSISTANT
Payer: COMMERCIAL

## 2025-06-30 VITALS — WEIGHT: 191.56 LBS | HEIGHT: 62 IN | BODY MASS INDEX: 35.25 KG/M2

## 2025-06-30 DIAGNOSIS — M54.16 LEFT LUMBAR RADICULOPATHY: ICD-10-CM

## 2025-06-30 DIAGNOSIS — M22.2X1 PATELLOFEMORAL PAIN SYNDROME OF BOTH KNEES: ICD-10-CM

## 2025-06-30 DIAGNOSIS — G89.29 CHRONIC PAIN OF LEFT KNEE: ICD-10-CM

## 2025-06-30 DIAGNOSIS — M17.12 PRIMARY OSTEOARTHRITIS OF LEFT KNEE: ICD-10-CM

## 2025-06-30 DIAGNOSIS — Z98.890 H/O LUMBOSACRAL SPINE SURGERY: ICD-10-CM

## 2025-06-30 DIAGNOSIS — M25.562 CHRONIC PAIN OF LEFT KNEE: ICD-10-CM

## 2025-06-30 DIAGNOSIS — M17.12 PRIMARY OSTEOARTHRITIS OF LEFT KNEE: Primary | ICD-10-CM

## 2025-06-30 DIAGNOSIS — M22.2X2 PATELLOFEMORAL PAIN SYNDROME OF BOTH KNEES: ICD-10-CM

## 2025-06-30 PROCEDURE — 99999 PR PBB SHADOW E&M-EST. PATIENT-LVL V: CPT | Mod: PBBFAC,,, | Performed by: PHYSICIAN ASSISTANT

## 2025-06-30 PROCEDURE — 1101F PT FALLS ASSESS-DOCD LE1/YR: CPT | Mod: CPTII,S$GLB,, | Performed by: PHYSICIAN ASSISTANT

## 2025-06-30 PROCEDURE — 99204 OFFICE O/P NEW MOD 45 MIN: CPT | Mod: S$GLB,,, | Performed by: PHYSICIAN ASSISTANT

## 2025-06-30 PROCEDURE — 1159F MED LIST DOCD IN RCRD: CPT | Mod: CPTII,S$GLB,, | Performed by: PHYSICIAN ASSISTANT

## 2025-06-30 PROCEDURE — 3008F BODY MASS INDEX DOCD: CPT | Mod: CPTII,S$GLB,, | Performed by: PHYSICIAN ASSISTANT

## 2025-06-30 PROCEDURE — 3044F HG A1C LEVEL LT 7.0%: CPT | Mod: CPTII,S$GLB,, | Performed by: PHYSICIAN ASSISTANT

## 2025-06-30 PROCEDURE — 73562 X-RAY EXAM OF KNEE 3: CPT | Mod: TC,PN,RT

## 2025-06-30 PROCEDURE — 3288F FALL RISK ASSESSMENT DOCD: CPT | Mod: CPTII,S$GLB,, | Performed by: PHYSICIAN ASSISTANT

## 2025-06-30 PROCEDURE — 1125F AMNT PAIN NOTED PAIN PRSNT: CPT | Mod: CPTII,S$GLB,, | Performed by: PHYSICIAN ASSISTANT

## 2025-06-30 PROCEDURE — 73564 X-RAY EXAM KNEE 4 OR MORE: CPT | Mod: 26,LT,, | Performed by: RADIOLOGY

## 2025-06-30 PROCEDURE — 73562 X-RAY EXAM OF KNEE 3: CPT | Mod: 26,RT,, | Performed by: RADIOLOGY

## 2025-06-30 RX ORDER — DICLOFENAC SODIUM 10 MG/G
2 GEL TOPICAL 4 TIMES DAILY
Qty: 100 G | Refills: 1 | Status: SHIPPED | OUTPATIENT
Start: 2025-06-30

## 2025-06-30 NOTE — PROGRESS NOTES
Cosme Cook PA-C  Orthopedic Surgery / Sports Medicine  [facility]      PATIENT ID: Maude Azevedo  YOB: 1955  MRN: [id number]    CHIEF COMPLAINT:  Left knee pain    REFERRED BY: Irina Franco    HISTORY OF PRESENT ILLNESS:   History of Present Illness    CHIEF COMPLAINT:  Left knee pain    HPI:  Patient presents with left knee pain ongoing for almost a year. Pain affects the entire knee and radiates up and down the leg, sometimes localizing to the upper thigh. She has been diagnosed with arthritis in the knee. She reports difficulty with certain activities, including putting on shoes. There is no specific injury reported; the pain began approximately a year ago and has been persistent since.    She has not received any specific treatment for the knee condition. Indomethacin, prescribed by her provider, has been helpful in easing the pain. She notes that pain increases when not taking the medication and decreases when taking it.    Her medical history is significant for a discectomy on the spine, performed more than five years ago, which has not caused any subsequent problems. She denies any current back pain or issues related to the previous spine surgery. She also denies any problems with the right knee.    Admits to feeling like her legs or fatigued with standing and walking for long periods of time.  Sitting down and resting seems to alleviate her symptoms in his she is able to stand up and walk again.  He is unsure when her spine surgery was but she states it was performed at least 5 years ago.  Her surgery was with  ([facility]).  No recent treatment for her lumbar spine.  Admits to numbness tingling in the left leg.  No right leg complaints.  She also as medial-sided left knee pain.  Pain with going up and down inclines and stairs.  Pain with squatting and inability to lift her left foot up the entire she has at times.  Denies any bowel or bladder incontinence.   "No nausea or vomiting.  No fever night sweats or chills.  No other issues other orthopedic complaints at this time.    PREVIOUS TREATMENTS:  Patient underwent a discectomy on her spine more than 5 years ago, which provided significant benefit. She reports marked improvement in pain and ability to sit comfortably after the procedure.    IMAGING:  An X-ray of the left knee was performed in January, which showed arthritis.  Images unavailable, performed at an outside facility    MEDICATIONS:  Patient is on Indomethacin for arthritis pain, which has been effective in easing her discomfort. She has also tried several topical treatments for arthritis pain, including Lidocaine cream, and Biofreeze, all of which were ineffective.    SURGICAL HISTORY:  Patient underwent a discectomy on her spine more than 5 years ago.          Patient was queried and this is the extent of the patients current complaints today.      PAST MEDICAL HISTORY:   Estimated body mass index is 35.04 kg/m² as calculated from the following:    Height as of this encounter: 5' 2" (1.575 m).    Weight as of this encounter: 86.9 kg (191 lb 9.3 oz).  Past Medical History:   Diagnosis Date    Adult general medical examination 12/23/2016    Anxiety     Congestive heart failure     Coronary atherosclerosis     Depression     Diffuse cystic mastopathy of left breast     Discharge from left nipple 01/24/2025    GERD (gastroesophageal reflux disease)     Hyperlipidemia     Hypertension     Hypothyroidism     Insomnia     Long term current use of therapeutic drug     Mass of lower outer quadrant of left breast     b-9 intramammary LN on core 10/6/2021    Menopause     Metabolic syndrome X     Sleep apnea     Vitamin D deficiency      Past Surgical History:   Procedure Laterality Date    CHOLECYSTECTOMY      HYSTERECTOMY      left breast ultrasound guuided biopsy 3- 4 o'clock mass and left axillary LN 10/6/2021      SPINE SURGERY      THYROIDECTOMY      post thyroid " cancer     Family History   Problem Relation Name Age of Onset    Breast cancer Mother  78    Heart disease Father      Diabetes Brother      Hypertension Brother      Cancer Daughter      Ovarian cancer Neg Hx       Social History[1]  Medication List with Changes/Refills   Current Medications    ALBUTEROL (PROVENTIL/VENTOLIN HFA) 90 MCG/ACTUATION INHALER    Inhale 2 puffs into the lungs every 6 (six) hours as needed for Wheezing.    AMLODIPINE (NORVASC) 10 MG TABLET    TAKE 1 TABLET(10 MG) BY MOUTH DAILY    ASPIRIN (ECOTRIN) 81 MG EC TABLET    Take 81 mg by mouth.    BLACK COHOSH ORAL    Take 40 mg by mouth as needed.    CHOLECALCIFEROL, VITAMIN D3, (VITAMIN D3) 250 MCG (10,000 UNIT) CAP CAPSULE    Take 2 capsules (20,000 Units total) by mouth once a week.    CLONIDINE (CATAPRES) 0.1 MG TABLET    Take 0.1 mg by mouth 2 (two) times daily.    EMPAGLIFLOZIN (JARDIANCE) 10 MG TABLET    Take 10 mg by mouth once daily.    HYDROXYZINE PAMOATE (VISTARIL) 25 MG CAP    TAKE 1 TO 2 CAPSULES BY MOUTH TWICE DAILY AS NEEDED FOR ANXIETY    INDOMETHACIN (INDOCIN) 50 MG CAPSULE    One tab po tid prn pain;take with food    LORATADINE (CLARITIN) 10 MG TABLET    Take 1 tablet (10 mg total) by mouth once daily.    MONTELUKAST (SINGULAIR) 10 MG TABLET    TAKE 1 TABLET(10 MG) BY MOUTH EVERY EVENING    MULTIVITAMIN WITH MINERALS TABLET    Take 1 tablet by mouth once daily.    MUPIROCIN (BACTROBAN) 2 % OINTMENT    Apply topically 3 (three) times daily.    NYSTATIN (MYCOSTATIN) POWDER    Apply topically 4 (four) times daily.    PANTOPRAZOLE (PROTONIX) 40 MG TABLET    Take 1 tablet (40 mg total) by mouth once daily.    PAROXETINE (PAXIL) 10 MG TABLET    Take 1 tablet (10 mg total) by mouth every morning.    ROSUVASTATIN (CRESTOR) 5 MG TABLET    Take 1 tablet (5 mg total) by mouth once daily.    SERTRALINE (ZOLOFT) 25 MG TABLET    Take 25 mg by mouth once daily.    SYNTHROID 137 MCG TAB TABLET    TAKE 1 TABLET(137 MCG) BY MOUTH BEFORE  BREAKFAST    TRAZODONE (DESYREL) 100 MG TABLET    TAKE 2 TABLETS BY MOUTH EVERY NIGHT AS NEEDED     Review of patient's allergies indicates:   Allergen Reactions    Ace inhibitors     Adhesive      Exfoliative dermatitis    Levothyroxine      Branded synthroid only    Sulfa (sulfonamide antibiotics) Swelling     Review of Systems   Constitutional: Negative for chills, fever, night sweats, weight gain and weight loss.   Respiratory:  Negative for shortness of breath.    Skin:  Negative for rash and suspicious lesions.   Musculoskeletal:  Positive for joint pain (left knee).   Gastrointestinal:  Negative for bowel incontinence, nausea and vomiting.   Genitourinary:  Negative for bladder incontinence.   Neurological:  Negative for numbness, paresthesias and sensory change.   Psychiatric/Behavioral:  Negative for altered mental status.        PHYSICAL EXAM:   GENERAL: Well appearing, appropriate for stated age, no acute distress, well nourished.  CARDIOVASCULAR:  No obvious cyanosis, extremities warm and well perfused.  PULMONARY: Normal respiratory effort, even and unlabored respirations.  NEURO: Awake, alert, and oriented x 3. NAD.  PSYCH: Mood & affect are appropriate.  SKIN: No readily visible rashes or skin breakdown.  HEENT: Head is normocephalic and atraumatic.        General Musculoskeletal Exam   Gait: antalgic         Left Knee Exam     Inspection   Erythema: absent  Scars: absent  Swelling: absent  Effusion: present (mild)  Deformity: absent  Bruising: absent    Tenderness   The patient tender to palpation of the medial joint line.    Range of Motion   Extension:  normal   Flexion:  130 (pain)     Tests   Meniscus   Gail:  Medial - positive Lateral - negative  Stability   Lachman: normal (-1 to 2mm)   PCL-Posterior Drawer: normal (0 to 2mm)  MCL - Valgus: normal (0 to 2mm)  LCL - Varus: normal (0 to 2mm)  Posterior Sag Test: negative  Posterolateral Corner: stable    Other   Sensation: normalBack (L-Spine  & T-Spine) / Neck (C-Spine) Exam     Back (L-Spine & T-Spine) Range of Motion   Extension:  abnormal Back extension: Increased pain, decreased range of motion.  Flexion:  normal     Spinal Sensation   Right Side Sensation  L-Spine Level: normal  Left Side Sensation  L-Spine Level: normal    Back (L-Spine & T-Spine) Tests   Left Side Tests  Femoral Stretch: positive      Muscle Strength   Right Lower Extremity   Hip Abduction: 5/5   Hip Flexion: 5/5   Hip Extensors: 5/5  Quadriceps:  5/5   Hamstrin/5   Anterior tibial:  5/5   Gastrocsoleus:  5/5   EHL:  5/5  Left Lower Extremity   Hip Abduction: 5/5   Hip Flexion: 5/5   Hip Extensors: 5/5  Quadriceps:  5/5   Hamstrin/5   Anterior tibial:  5/5   Gastrocsoleus:  5/5   EHL:  5/5    Reflexes     Left Side  Achilles:  2+  Ankle Clonus:  absent  Quadriceps:  2+    Right Side   Achilles:  2+  Ankle Clonus:  absent  Quadriceps:  2+    Vascular Exam     Right Pulses  Dorsalis Pedis:      2+  Posterior Tibial:      2+        Left Pulses  Dorsalis Pedis:      2+  Posterior Tibial:      2+        Edema  Left Lower Leg: absent          IMAGING:  Relevant imaging results reviewed and interpreted by me, discussed with the patient and / or family today.     Four view x-ray of the left knee AP PA flexion lateral and sunrise view performed at today's office visit shows no obvious fracture or dislocation or acute bony finding.  Mild degenerative change seen in the medial and lateral compartment of the relatively open joint space noted.  It is more moderate joint space narrowing with lateral subluxation of the patella in the patellofemoral groove.  No other acute bony findings otherwise noted.    ASSESSMENT:    Encounter Diagnoses   Name Primary?    Primary osteoarthritis of left knee Yes    Patellofemoral pain syndrome of both knees     Chronic pain of left knee     H/O lumbosacral spine surgery         PLAN / MEDICAL DECISION MAKIN.  Medications:    We will avoid oral  NSAIDs secondary to medical history   Voltaren gel  2.  PT/OT:   Physical therapy referral  [place] [facility]   Evaluation and treatment for left knee osteoarthritis and patellofemoral pain syndrome, left lumbar radiculopathy  3.  Advanced Imaging:   None   4.  Injections:   Candidate for a steroid injection, patient declined today   Candidate for viscosupplementation of the left knee  5.  Referral:    None   6.  DME:    None  7.  Return to Clinic:  4-6 weeks for left knee or as needed if symptoms improve  Imaging needed at next visit:  None  8.  If no improvement by next visit, options include:    Further work-up lumbar spine, recommend following up with her spine surgeon  as her symptoms today sound like subtle lumbar radiculopathy, possible adjacent segment stenosis versus recurrent stenosis  Left knee steroid injection   Left knee viscosupplementation  9.  Work/school release/restrictions:   None       I discussed worrisome and red flag signs and symptoms with the patient. The patient expressed understanding and agreed to alert me immediately or to go to the emergency room if they experience any of these.   Treatment plan was developed with input from the patient/family, and they expressed understanding and agreement with the plan. All questions were answered today.           Cosme Cook PA-C  Sports Medicine Physician Assistant     Disclaimer: This note was prepared using a voice recognition system and is likely to have sound alike errors within the text.        [1]   Social History  Socioeconomic History    Marital status: Single   Tobacco Use    Smoking status: Never    Smokeless tobacco: Never   Substance and Sexual Activity    Alcohol use: Yes    Drug use: No     Social Drivers of Health     Food Insecurity: No Food Insecurity (3/24/2025)    Received from [facility]    Hunger Vital Sign     Worried About Running Out of Food in the Last Year: Never true     Ran Out of Food in the Last Year:  Never true   Transportation Needs: No Transportation Needs (3/24/2025)    Received from [facility]    PRAPARE - Transportation     Lack of Transportation (Medical): No     Lack of Transportation (Non-Medical): No   Housing Stability: Unknown (3/24/2025)    Received from [facility]    Housing Stability Vital Sign     Unable to Pay for Housing in the Last Year: No     Homeless in the Last Year: No

## 2025-06-30 NOTE — PATIENT INSTRUCTIONS
Assessment:  Maude Azevedo is a 69 y.o. female with left knee pain, left leg paresthesias    Encounter Diagnoses   Name Primary?    Primary osteoarthritis of left knee Yes    Patellofemoral pain syndrome of both knees     Chronic pain of left knee     H/O lumbosacral spine surgery     Left lumbar radiculopathy         Plan:  Physical therapy  Karen/elite   Evaluation and treatment for left knee osteoarthritis, patellofemoral pain syndrome, left lumbar radiculopathy  Voltaren gel prescription  If no improvement with symptoms, recommend following with her spine surgeon Dr. Rg ([facility])    If no improvement by next visit, options include:  Left knee steroid injection   Viscosupplementation    Treatment plan was developed with input from the patient/family, and they expressed understanding and agreement with the plan. All questions were answered today.    Follow-up:  4-6 weeks for left knee or with her spine surgeon or sooner if there are any problems between now and then.    Disclaimer: This note was prepared using a voice recognition system and is likely to have sound alike errors within the text.

## 2025-07-08 ENCOUNTER — CLINICAL SUPPORT (OUTPATIENT)
Facility: HOSPITAL | Age: 70
End: 2025-07-08
Payer: COMMERCIAL

## 2025-07-08 DIAGNOSIS — M22.2X2 PATELLOFEMORAL PAIN SYNDROME OF BOTH KNEES: Primary | ICD-10-CM

## 2025-07-08 DIAGNOSIS — M54.16 LEFT LUMBAR RADICULOPATHY: ICD-10-CM

## 2025-07-08 DIAGNOSIS — M22.2X1 PATELLOFEMORAL PAIN SYNDROME OF BOTH KNEES: Primary | ICD-10-CM

## 2025-07-08 PROCEDURE — 97162 PT EVAL MOD COMPLEX 30 MIN: CPT | Mod: PN | Performed by: PHYSICAL THERAPIST

## 2025-07-08 PROCEDURE — 97110 THERAPEUTIC EXERCISES: CPT | Mod: PN | Performed by: PHYSICAL THERAPIST

## 2025-07-08 NOTE — PROGRESS NOTES
Outpatient Rehab    Physical Therapy Evaluation    Patient Name: Maude Azevedo  MRN: [id number]  YOB: 1955  Encounter Date: 7/8/2025    Therapy Diagnosis: No diagnosis found.  Physician: Cosme Cook P*    Physician Orders: Eval and Treat  Medical Diagnosis: Patellofemoral pain syndrome of both knees  Left lumbar radiculopathy  Surgical Diagnosis: Not applicable for this Episode   Surgical Date: Not applicable for this Episode  Days Since Last Surgery: Not applicable for this Episode    Visit # / Visits Authorized:  1 / 1  Insurance Authorization Period: 6/30/2025 to 6/30/2026  Date of Evaluation: 7/8/2025  Plan of Care Certification: 7/8/2025 to 10/8/25     Time In: 0834   Time Out: 0935  Total Time (in minutes): 61   Total Billable Time (in minutes):      Intake Outcome Measure for FOTO Survey    Therapist reviewed FOTO scores for Maude Azevedo on 7/8/2025.   FOTO report - see Media section or FOTO account episode details.     Intake Score: 36%    Precautions:       Subjective   History of Present Illness  Maude is a 69 y.o. female                  History of Present Condition/Illness: Diagnosed with L knee arthritis. Started around 1 year ago when she was staying in the hospital with her daughter. She thought the cold air might have contributed. She brushed it off for awhile but things have gotten worse.     Pain     Patient reports a current pain level of 8/10. Pain at best is reported as 5/10. Pain at worst is reported as 10/10.   Location: Front and back of knee  Clinical Progression (since onset): Worsening  Pain Qualities: Aching  Pain-Relieving Factors: Rest, Medications - over-the-counter  Pain-Aggravating Factors: Other (Comment), Walking           Past Medical History/Physical Systems Review:   Maude Azevedo  has a past medical history of Adult general medical examination, Anxiety, Congestive heart failure, Coronary atherosclerosis, Depression, Diffuse cystic  mastopathy of left breast, Discharge from left nipple, GERD (gastroesophageal reflux disease), Hyperlipidemia, Hypertension, Hypothyroidism, Insomnia, Long term current use of therapeutic drug, Mass of lower outer quadrant of left breast, Menopause, Metabolic syndrome X, Sleep apnea, and Vitamin D deficiency.    Maude Azevedo  has a past surgical history that includes Cholecystectomy; Hysterectomy; Spine surgery; Thyroidectomy; and left breast ultrasound guuided biopsy 3- 4 o'clock mass and left axillary LN 10/6/2021.    Maude has a current medication list which includes the following prescription(s): albuterol, amlodipine, aspirin, black cohosh root, cholecalciferol (vitamin d3), clonidine, diclofenac sodium, empagliflozin, hydroxyzine pamoate, indomethacin, loratadine, montelukast, multivitamin with minerals, mupirocin, nystatin, pantoprazole, paroxetine, rosuvastatin, sertraline, synthroid, and trazodone.    Review of patient's allergies indicates:   Allergen Reactions    Ace inhibitors     Adhesive      Exfoliative dermatitis    Levothyroxine      Branded synthroid only    Sulfa (sulfonamide antibiotics) Swelling        Objective          Observation:   GAIT: Antalgic     Lower extremity reflexes:  Reflex Left Right   Patella (L2-4) 2+ 2+   Achilles (S1) 1+ 0+   Millan Negative Negative   Babinski Negative Negative   Clonus Negative Negative     ,   Lower extremity myotomes  Neuro Testing Right   Left     L2 (hip flexion) 5/5 3+/5 pain   L3 (knee extension) 5/5 5/5   L4 (ankle DF) 5/5 5/5   L5 (great toe extension) 5/5 5/5   S1 (plantarflexion) 5/5 5/5    ,   Lower  Limb Neurodynamic testing:   Right Left   Slump test - -          RANGE OF MOTION:   Hip AROM/PROM Right Left Pain/Dysfunction with Movement Goal   Hip Flexion (120º) 100 90  100   Hip Extension (30º)       Hip Abduction (45º)       Hip IR (45º) 10 0  10   Hip ER (45º) 30 20         Knee AROM/PROM Right Left Pain/Dysfunction with Movement  Goal   Knee Flexion (135º) 120 100  125   Knee Extension (0º) 0 Lacking 10  0        Joint mobility:  Not assessed today      SENSATION  [] Intact to Light Touch   [x] Impaired: Mid anterior thigh, pain with light touch lateral malleolus      PALPATION: Structures: Increased tenderness to palpation of:        Treatment:     CPT Code Intervention Date/Notes  7/8             TE Hip hinge 2 x 10   TE LAQ 2 x 10       Time Entry(in minutes):   00 minutes of Manual therapy (MT) to improve pain and ROM. (85513)  00 minutes of Neuromuscular Re-Education (NMR)  to improve: Balance, Coordination, Kinesthetic, Sense, Proprioception, and Posture. (19275)  00 minutes of Therapeutic Activities (TA) to improve functional performance. (29262)  10 minutes of Therapeutic Exercise (TE) to develop strength, endurance, ROM, and flexibility. (73187)  []  Unattended Electrical Stimulation (ES) for muscle performance and/or pain modulation. (71963)  []  Vasopneumatic Device Therapy () for management of swelling/edema. (68514)  BFR: Blood flow restriction applied during exercise  NP: Not Performed  * indicates that exercise was performed, not billed today since patient was not under direct one-on-one supervision    Assessment & Plan   Assessment  Maude presents with a condition of Moderate complexity.   Presentation of Symptoms: Stable  Will Comorbidities Impact Care: No       Functional Limitations: Activity tolerance, Pain with ADLs/IADLs, Performing household chores, Standing tolerance  Impairments: Pain with functional activity, Impaired physical strength, Activity intolerance  Personal Factors Affecting Prognosis: Pain    Patient Goal for Therapy (PT): Get back to full function without pain  Prognosis: Good  Assessment Details: Maude Azevedo presents to physical therapy today with signs and symptoms consistent with referring diagnosis.  She has clear joint-related knee pain, but also seems to have a more centrally-mediated  nociplastic pain presentation. Yellow flags include depression, emotional stress, work troubles, among others. She would benefit from pain-neuroscience education in addition to interventions specifically addressing impairments. Maude would benefit from skilled care to address the listed deficits.        Plan  From a physical therapy perspective, the patient would benefit from: Skilled Rehab Services    Planned therapy interventions include: Therapeutic exercise, Therapeutic activities, Neuromuscular re-education, Manual therapy, and Gait training.    Planned modalities to include: Electrical stimulation - passive/unattended and Other (Comment).        Visit Frequency: 2 times Per Week for 12 Weeks.       This plan was discussed with Patient.   Discussion participants: Agreed Upon Plan of Care             The patient's spiritual, cultural, and educational needs were considered, and the patient is agreeable to the plan of care and goals.           Goals:     Short Term Goals:  6 weeks Status  Date Met   PAIN: Pt will report worst pain of 5/10 in order to progress toward max functional ability and improve quality of life. [x] Progressing  [] Met  [] Not Met    FUNCTION: Patient will demonstrate improved function as indicated by a functional score improvement of at least 5 points on FOTO. [x] Progressing  [] Met  [] Not Met    MOBILITY: Patient will improve AROM to 50% of stated goals, listed in objective measures above, in order to progress towards independence with functional activities.  [x] Progressing  [] Met  [] Not Met    STRENGTH: Patient will improve strength to 50% of stated goals, listed in objective measures above, in order to progress towards independence with functional activities. [x] Progressing  [] Met  [] Not Met    POSTURE: Patient will correct postural deviations in sitting and standing, to decrease pain and promote long term stability.  [x] Progressing  [] Met  [] Not Met    GAIT: Patient will  demonstrate improved gait mechanics including symmetrical stance time in order to improve functional mobility, improve quality of life, and decrease risk of further injury or fall.  [x] Progressing  [] Met  [] Not Met    HEP: Patient will demonstrate independence with HEP in order to progress toward functional independence. [x] Progressing  [] Met  [] Not Met      Long Term Goals:  12 weeks Status Date Met   PAIN: Pt will report worst pain of 2/10 in order to progress toward max functional ability and improve quality of life [x] Progressing  [] Met  [] Not Met    FUNCTION: Patient will demonstrate improved function as indicated by a FOTO functional score improvement as listed in header. [x] Progressing  [] Met  [] Not Met    MOBILITY: Patient will improve AROM to stated goals, listed in objective measures above, in order to return to maximal functional potential and improve quality of life.  [x] Progressing  [] Met  [] Not Met    STRENGTH: Patient will improve strength to stated goals, listed in objective measures above, in order to improve functional independence and quality of life.  [x] Progressing  [] Met  [] Not Met    GAIT: Patient will demonstrate normalized gait mechanics with minimal compensation in order to return to PLOF. [x] Progressing  [] Met  [] Not Met    Patient will return to normal ADL's, IADL's, community involvement, recreational activities, and work-related activities with less than or equal to 2/10 pain and maximal function.  [x] Progressing  [] Met  [] Not Met        Mandeep Burrell, PT, DPT

## 2025-07-16 ENCOUNTER — CLINICAL SUPPORT (OUTPATIENT)
Facility: HOSPITAL | Age: 70
End: 2025-07-16
Payer: COMMERCIAL

## 2025-07-16 DIAGNOSIS — R52 PAIN AGGRAVATED BY ACTIVITIES OF DAILY LIVING: ICD-10-CM

## 2025-07-16 DIAGNOSIS — R29.898 DECREASED STRENGTH OF LOWER EXTREMITY: Primary | ICD-10-CM

## 2025-07-16 PROCEDURE — 97112 NEUROMUSCULAR REEDUCATION: CPT | Mod: PN | Performed by: PHYSICAL THERAPIST

## 2025-07-16 PROCEDURE — 97110 THERAPEUTIC EXERCISES: CPT | Mod: PN | Performed by: PHYSICAL THERAPIST

## 2025-07-16 NOTE — PROGRESS NOTES
[facility]   Physical Therapy Treatment Note       Patient Name: Maude Azevedo  MRN: [id number]  YOB: 1955  Encounter Date: 7/16/2025    Therapy Diagnosis:   Encounter Diagnoses   Name Primary?    Decreased strength of lower extremity Yes    Pain aggravated by activities of daily living      Physician: Cosme Cook P*    Physician Orders: Eval and Treat  Medical Diagnosis: Patellofemoral pain syndrome of both knees  Left lumbar radiculopathy    Visit # / Visits Authorized:  1 / 15  Insurance Authorization Period: 7/8/2025 to 12/31/2025  Date of Evaluation: 7/8/2025  Plan of Care Certification:  7/8/2025 to 10/8/2025        Time In: 1335  Time Out: 1430  Total Time: 55 minutes      Subjective   pt reports her knee feeling better. also notes her mood being low. says she has been moving it around more, though..  Pain reported as 1/10.           Objective   Objective Measures updated at progress report unless specified.     FOTO:  Intake Score:   %  Survey Score 2:   %  Survey Score 3:   %    Treatment       Maude received the treatments listed below:    CPT Code Intervention Date/Notes  7/16/2025     TE Bike 10'   NR Pain neuroscience education Sensitive nerves story   NR Bridging -   NR SL balance 2 min ea   TE Hip hinge 2 x 10   TE LAQ 2 x 10 #2   TE Leg press 35# 3 x 15              Education: Patient educated on the concept of the nervous system as the bodies alarm system, and the role of nociception to warn the body of danger. Peripheral nerve sensitization, hyperalgesia and allodynia were explained using metaphors to promote deep learning. Homework: Patient encouraged to identify personal yellow flags, and explore/identify activity limitations as a result of nervous system hypersensitivity.     25 minutes of (TE) Therapeutic Exercise to develop strength, endurance, ROM, and flexibility. (73141)  00 minutes of (MT) Manual therapy to improve pain and ROM.  (63786)  30 minutes of (NR) Neuromuscular Re-Education to improve: Balance, Coordination, Kinesthetic, Sense, Proprioception, and Posture. (23405)  00 minutes of (TA) Therapeutic Activities to improve functional performance. (85758)  00 minutes of (PPT) Physical performance testing - Report in Objective Section(99315)  00 minutes of (GT) Gait Training to improve functional mobility. (66836)  [] (MA) Comprehensive Motion Analysis - 3D Motion Analysis using Liquid Bronze Markerless System - Report in Media Section (42120)  [] (ES) Unattended Electrical Stimulation for muscle performance and/or pain modulation. (57703)  [] ()Vasopneumatic Device Therapy for management of swelling/edema. (37113)  BFR: Blood flow restriction applied during exercise  NP: Not Performed           Assessment & Plan     We had a very good conversation today about Chen neuroscience education with a focus on the sensitive nerve story. Patient expressed good understanding of the possibility of central nervous system sensitization as a contributor to chronic pain. We discussed her recent depression and lack of enjoyment with normal day-to-day activities. I encouraged her to go out into the community more for things like going to [employer] to spend more time with friends.         Madue is progressing well towards her goals.     Pt prognosis is Good  Patient will continue to benefit from skilled outpatient physical therapy to address the deficits listed in the problem list box on initial evaluation, provide pt/family education and to maximize pt's level of independence in the home and community environment.     Patient's spiritual, cultural, and educational needs considered and patient agreeable to plan of care and goals.      Anticipated barriers to physical therapy: Pain    Goals:  Short Term Goals:  6 weeks Status  Date Met   PAIN: Pt will report worst pain of 5/10 in order to progress toward max functional ability and improve quality of life.  [x] Progressing  [] Met  [] Not Met     FUNCTION: Patient will demonstrate improved function as indicated by a functional score improvement of at least 5 points on FOTO. [x] Progressing  [] Met  [] Not Met     MOBILITY: Patient will improve AROM to 50% of stated goals, listed in objective measures above, in order to progress towards independence with functional activities.  [x] Progressing  [] Met  [] Not Met     STRENGTH: Patient will improve strength to 50% of stated goals, listed in objective measures above, in order to progress towards independence with functional activities. [x] Progressing  [] Met  [] Not Met     POSTURE: Patient will correct postural deviations in sitting and standing, to decrease pain and promote long term stability.  [x] Progressing  [] Met  [] Not Met     GAIT: Patient will demonstrate improved gait mechanics including symmetrical stance time in order to improve functional mobility, improve quality of life, and decrease risk of further injury or fall.  [x] Progressing  [] Met  [] Not Met     HEP: Patient will demonstrate independence with HEP in order to progress toward functional independence. [x] Progressing  [] Met  [] Not Met        Long Term Goals:  12 weeks Status Date Met   PAIN: Pt will report worst pain of 2/10 in order to progress toward max functional ability and improve quality of life [x] Progressing  [] Met  [] Not Met     FUNCTION: Patient will demonstrate improved function as indicated by a FOTO functional score improvement as listed in header. [x] Progressing  [] Met  [] Not Met     MOBILITY: Patient will improve AROM to stated goals, listed in objective measures above, in order to return to maximal functional potential and improve quality of life.  [x] Progressing  [] Met  [] Not Met     STRENGTH: Patient will improve strength to stated goals, listed in objective measures above, in order to improve functional independence and quality of life.  [x] Progressing  [] Met  []  Not Met     GAIT: Patient will demonstrate normalized gait mechanics with minimal compensation in order to return to PLOF. [x] Progressing  [] Met  [] Not Met     Patient will return to normal ADL's, IADL's, community involvement, recreational activities, and work-related activities with less than or equal to 2/10 pain and maximal function.  [x] Progressing  [] Met  [] Not Met          PLAN   Continue per plan of care.  Progress as tolerated.    Mandeep Burrell, PT, DPT

## 2025-07-20 PROBLEM — R29.898 DECREASED STRENGTH OF LOWER EXTREMITY: Status: ACTIVE | Noted: 2025-07-20

## 2025-07-20 PROBLEM — R52 PAIN AGGRAVATED BY ACTIVITIES OF DAILY LIVING: Status: ACTIVE | Noted: 2025-07-20

## 2025-07-22 ENCOUNTER — CLINICAL SUPPORT (OUTPATIENT)
Facility: HOSPITAL | Age: 70
End: 2025-07-22
Payer: COMMERCIAL

## 2025-07-22 DIAGNOSIS — R29.898 DECREASED STRENGTH OF LOWER EXTREMITY: Primary | ICD-10-CM

## 2025-07-22 DIAGNOSIS — R52 PAIN AGGRAVATED BY ACTIVITIES OF DAILY LIVING: ICD-10-CM

## 2025-07-22 PROCEDURE — 97112 NEUROMUSCULAR REEDUCATION: CPT | Mod: PN | Performed by: PHYSICAL THERAPIST

## 2025-07-22 PROCEDURE — 97110 THERAPEUTIC EXERCISES: CPT | Mod: PN | Performed by: PHYSICAL THERAPIST

## 2025-07-22 NOTE — PROGRESS NOTES
[facility]   Physical Therapy Treatment Note       Patient Name: Maude Azevedo  MRN: [id number]  YOB: 1955  Encounter Date: 7/22/2025    Therapy Diagnosis:   Encounter Diagnoses   Name Primary?    Decreased strength of lower extremity Yes    Pain aggravated by activities of daily living      Physician: Cosme Cook P*    Physician Orders: Eval and Treat  Medical Diagnosis: Patellofemoral pain syndrome of both knees  Left lumbar radiculopathy    Visit # / Visits Authorized:  2 / 15  Insurance Authorization Period: 7/8/2025 to 12/31/2025  Date of Evaluation: 7/8/2025  Plan of Care Certification:  7/8/2025 to 10/8/2025        Time In: 0740  Time Out: 0840  Total Time: 60 minutes      Subjective   Feeling better today. She was able to make it to [employer] this weekend..  Pain reported as 2/10.           Objective   Objective Measures updated at progress report unless specified.     FOTO:  Intake Score:   %  Survey Score 2:   %  Survey Score 3:   %    Treatment       Maude received the treatments listed below:    CPT Code Intervention Date/Notes  7/22   TE Bike 10'   NR Pain neuroscience education Sensitive nerves story review   NR Bridging -   NR SL balance 2 min ea   TE Hip hinge 2 x 10   TE LAQ 2 x 10 #2   TE Leg press 35# 3 x 15   Education: Patient educated on the concept of the nervous system as the bodies alarm system, and the role of nociception to warn the body of danger. Peripheral nerve sensitization, hyperalgesia and allodynia were explained using metaphors to promote deep learning. Homework: Patient encouraged to identify personal yellow flags, and explore/identify activity limitations as a result of nervous system hypersensitivity.     00 minutes of Manual therapy (MT) to improve pain and ROM. (37797)  25 minutes of Therapeutic Exercise (TE) to develop strength, endurance, ROM, and flexibility. (41953)  25 minutes of Neuromuscular Re-Education (NR)  to  improve: Balance, Coordination, Kinesthetic, Sense, Proprioception, and Posture. (36339)  00 minutes of Therapeutic Activities (TA) to improve functional performance. (55029)  Unattended Electrical Stimulation (ES) for muscle performance and/or pain modulation. (98434)  Vasopneumatic Device Therapy () for management of swelling/edema. (00499)  BFR: Blood flow restriction applied during exercise  NP: Not Performed          Assessment & Plan     We had a conversation today reviewing the sensitive nerves story. We also discussed yellow flags, and the potential impact of some of her life stressors on her overall pain prognosis. Patient did well with exercise exercises today and encouraged her to begin to keep a journal of any mindfulness or exercise practices that she does at home         Maude is progressing well towards her goals.     Pt prognosis is Good  Patient will continue to benefit from skilled outpatient physical therapy to address the deficits listed in the problem list box on initial evaluation, provide pt/family education and to maximize pt's level of independence in the home and community environment.     Patient's spiritual, cultural, and educational needs considered and patient agreeable to plan of care and goals.      Anticipated barriers to physical therapy: Pain    Goals:  Short Term Goals:  6 weeks Status  Date Met   PAIN: Pt will report worst pain of 5/10 in order to progress toward max functional ability and improve quality of life. [x] Progressing  [] Met  [] Not Met     FUNCTION: Patient will demonstrate improved function as indicated by a functional score improvement of at least 5 points on FOTO. [x] Progressing  [] Met  [] Not Met     MOBILITY: Patient will improve AROM to 50% of stated goals, listed in objective measures above, in order to progress towards independence with functional activities.  [x] Progressing  [] Met  [] Not Met     STRENGTH: Patient will improve strength to 50% of  stated goals, listed in objective measures above, in order to progress towards independence with functional activities. [x] Progressing  [] Met  [] Not Met     POSTURE: Patient will correct postural deviations in sitting and standing, to decrease pain and promote long term stability.  [x] Progressing  [] Met  [] Not Met     GAIT: Patient will demonstrate improved gait mechanics including symmetrical stance time in order to improve functional mobility, improve quality of life, and decrease risk of further injury or fall.  [x] Progressing  [] Met  [] Not Met     HEP: Patient will demonstrate independence with HEP in order to progress toward functional independence. [x] Progressing  [] Met  [] Not Met        Long Term Goals:  12 weeks Status Date Met   PAIN: Pt will report worst pain of 2/10 in order to progress toward max functional ability and improve quality of life [x] Progressing  [] Met  [] Not Met     FUNCTION: Patient will demonstrate improved function as indicated by a FOTO functional score improvement as listed in header. [x] Progressing  [] Met  [] Not Met     MOBILITY: Patient will improve AROM to stated goals, listed in objective measures above, in order to return to maximal functional potential and improve quality of life.  [x] Progressing  [] Met  [] Not Met     STRENGTH: Patient will improve strength to stated goals, listed in objective measures above, in order to improve functional independence and quality of life.  [x] Progressing  [] Met  [] Not Met     GAIT: Patient will demonstrate normalized gait mechanics with minimal compensation in order to return to PLOF. [x] Progressing  [] Met  [] Not Met     Patient will return to normal ADL's, IADL's, community involvement, recreational activities, and work-related activities with less than or equal to 2/10 pain and maximal function.  [x] Progressing  [] Met  [] Not Met          PLAN   Continue per plan of care.  Progress as tolerated.    Mandeep Burrell, PT,  DPT

## 2025-07-24 ENCOUNTER — CLINICAL SUPPORT (OUTPATIENT)
Facility: HOSPITAL | Age: 70
End: 2025-07-24
Payer: COMMERCIAL

## 2025-07-24 DIAGNOSIS — R29.898 DECREASED STRENGTH OF LOWER EXTREMITY: Primary | ICD-10-CM

## 2025-07-24 DIAGNOSIS — R52 PAIN AGGRAVATED BY ACTIVITIES OF DAILY LIVING: ICD-10-CM

## 2025-07-24 PROCEDURE — 97112 NEUROMUSCULAR REEDUCATION: CPT | Mod: PN | Performed by: PHYSICAL THERAPIST

## 2025-07-24 PROCEDURE — 97140 MANUAL THERAPY 1/> REGIONS: CPT | Mod: PN | Performed by: PHYSICAL THERAPIST

## 2025-07-24 PROCEDURE — 97530 THERAPEUTIC ACTIVITIES: CPT | Mod: PN | Performed by: PHYSICAL THERAPIST

## 2025-07-24 PROCEDURE — 97110 THERAPEUTIC EXERCISES: CPT | Mod: PN | Performed by: PHYSICAL THERAPIST

## 2025-07-24 NOTE — PROGRESS NOTES
[facility]   Physical Therapy Treatment Note       Patient Name: Maude Azevedo  MRN: [id number]  YOB: 1955  Encounter Date: 7/24/2025    Therapy Diagnosis:   Encounter Diagnoses   Name Primary?    Decreased strength of lower extremity Yes    Pain aggravated by activities of daily living      Physician: Cosme Cook P*    Physician Orders: Eval and Treat  Medical Diagnosis: Patellofemoral pain syndrome of both knees  Left lumbar radiculopathy    Visit # / Visits Authorized:  3 / 15  Insurance Authorization Period: 7/8/2025 to 12/31/2025  Date of Evaluation: 7/8/2025  Plan of Care Certification:  7/8/2025 to 10/8/2025        Time In:    Time Out:    Total Time:   minutes      Subjective   She feels that she is doing better..              Objective   Objective Measures updated at progress report unless specified.     FOTO:  Intake Score:   %  Survey Score 2:   %  Survey Score 3:   %    Treatment       Maude received the treatments listed below:      CPT Code Intervention Date/Notes  7/24   TE Bike 10'   MT Manual therapy Hip distraction mobilization   TA  Sit to stand  Up fast, down slow 20 2x8    NR SL balance 2 min ea   NR Bridging GTB 2x10   TA Hip hinge 2 x 10   TE LAQ 2 x 10 #2   TE Leg press 35# 3 x 15     10 minutes of Manual therapy (MT) to improve pain and ROM. (33629)  10 minutes of Neuromuscular Re-Education (NMR)  to improve: Balance, Coordination, Kinesthetic, Sense, Proprioception, and Posture. (16161)  10 minutes of Therapeutic Activities (TA) to improve functional performance. (08034)  20 minutes of Therapeutic Exercise (TE) to develop strength, endurance, ROM, and flexibility. (86200)  00 minutes of (PPT) Physical performance testing - Report in Objective Section(14682)  []  Unattended Electrical Stimulation (ES) for muscle performance and/or pain modulation. (56730)  []  Vasopneumatic Device Therapy () for management of swelling/edema.  (83037)  BFR: Blood flow restriction applied during exercise  NP: Not Performed  * indicates that exercise was performed, not billed today since patient was not under direct one-on-one supervision         Assessment & Plan     Today we focused more on improving quality of movement and building functional capacity. Patient tolerated well without any significant increase in symptoms. I encouraged patient to utilize the pain journal to maintain consistency with mindfullness, gratitude, and regular exercise.         Maude is progressing well towards her goals.     Pt prognosis is Good  Patient will continue to benefit from skilled outpatient physical therapy to address the deficits listed in the problem list box on initial evaluation, provide pt/family education and to maximize pt's level of independence in the home and community environment.     Patient's spiritual, cultural, and educational needs considered and patient agreeable to plan of care and goals.      Anticipated barriers to physical therapy: Pain    Goals:  Short Term Goals:  6 weeks Status  Date Met   PAIN: Pt will report worst pain of 5/10 in order to progress toward max functional ability and improve quality of life. [x] Progressing  [] Met  [] Not Met     FUNCTION: Patient will demonstrate improved function as indicated by a functional score improvement of at least 5 points on FOTO. [x] Progressing  [] Met  [] Not Met     MOBILITY: Patient will improve AROM to 50% of stated goals, listed in objective measures above, in order to progress towards independence with functional activities.  [x] Progressing  [] Met  [] Not Met     STRENGTH: Patient will improve strength to 50% of stated goals, listed in objective measures above, in order to progress towards independence with functional activities. [x] Progressing  [] Met  [] Not Met     POSTURE: Patient will correct postural deviations in sitting and standing, to decrease pain and promote long term  stability.  [x] Progressing  [] Met  [] Not Met     GAIT: Patient will demonstrate improved gait mechanics including symmetrical stance time in order to improve functional mobility, improve quality of life, and decrease risk of further injury or fall.  [x] Progressing  [] Met  [] Not Met     HEP: Patient will demonstrate independence with HEP in order to progress toward functional independence. [x] Progressing  [] Met  [] Not Met        Long Term Goals:  12 weeks Status Date Met   PAIN: Pt will report worst pain of 2/10 in order to progress toward max functional ability and improve quality of life [x] Progressing  [] Met  [] Not Met     FUNCTION: Patient will demonstrate improved function as indicated by a FOTO functional score improvement as listed in header. [x] Progressing  [] Met  [] Not Met     MOBILITY: Patient will improve AROM to stated goals, listed in objective measures above, in order to return to maximal functional potential and improve quality of life.  [x] Progressing  [] Met  [] Not Met     STRENGTH: Patient will improve strength to stated goals, listed in objective measures above, in order to improve functional independence and quality of life.  [x] Progressing  [] Met  [] Not Met     GAIT: Patient will demonstrate normalized gait mechanics with minimal compensation in order to return to PLOF. [x] Progressing  [] Met  [] Not Met     Patient will return to normal ADL's, IADL's, community involvement, recreational activities, and work-related activities with less than or equal to 2/10 pain and maximal function.  [x] Progressing  [] Met  [] Not Met          PLAN   Continue per plan of care.  Progress as tolerated.    Mandeep Burrell, PT, DPT

## 2025-08-07 ENCOUNTER — TELEPHONE (OUTPATIENT)
Dept: PHARMACY | Facility: CLINIC | Age: 70
End: 2025-08-07
Payer: COMMERCIAL

## 2025-08-07 NOTE — TELEPHONE ENCOUNTER
[facility]/Population Health Chart Review & Patient Outreach Details For Medication Adherence Project    Reason for Outreach Encounter: 3rd Party payor non-compliance report ([employer], [employer], C, etc)  2.  Patient Outreach Method: Reviewed patient chart   3.   Medication in question:    Diabetes Medications              JARDIANCE 10 mg tablet TAKE 1 TABLET(10 MG) BY MOUTH DAILY              Hypertension Medications              amLODIPine (NORVASC) 10 MG tablet TAKE 1 TABLET(10 MG) BY MOUTH DAILY    cloNIDine (CATAPRES) 0.1 MG tablet Take 0.1 mg by mouth 2 (two) times daily.                 Jardiance  last filled  7/31/25 for 30 day supply    HTN N/A    4.  Reviewed and or Updates Made To: Patient Chart  5. Outreach Outcomes and/or actions taken: Patient filled medication and is on track to be adherent  Additional Notes:  Losartan/HCTZ : Discontinued by: Teetee Franco MD on 10/2/2023 10:05

## 2025-08-12 ENCOUNTER — CLINICAL SUPPORT (OUTPATIENT)
Facility: HOSPITAL | Age: 70
End: 2025-08-12
Payer: COMMERCIAL

## 2025-08-12 DIAGNOSIS — R52 PAIN AGGRAVATED BY ACTIVITIES OF DAILY LIVING: ICD-10-CM

## 2025-08-12 DIAGNOSIS — R29.898 DECREASED STRENGTH OF LOWER EXTREMITY: Primary | ICD-10-CM

## 2025-08-12 PROCEDURE — 97112 NEUROMUSCULAR REEDUCATION: CPT | Mod: PN | Performed by: PHYSICAL THERAPIST

## 2025-08-12 PROCEDURE — 97140 MANUAL THERAPY 1/> REGIONS: CPT | Mod: PN | Performed by: PHYSICAL THERAPIST

## 2025-08-14 ENCOUNTER — CLINICAL SUPPORT (OUTPATIENT)
Facility: HOSPITAL | Age: 70
End: 2025-08-14
Payer: COMMERCIAL

## 2025-08-14 DIAGNOSIS — R52 PAIN AGGRAVATED BY ACTIVITIES OF DAILY LIVING: ICD-10-CM

## 2025-08-14 DIAGNOSIS — R29.898 DECREASED STRENGTH OF LOWER EXTREMITY: Primary | ICD-10-CM

## 2025-08-14 PROCEDURE — 97112 NEUROMUSCULAR REEDUCATION: CPT | Mod: PN | Performed by: PHYSICAL THERAPIST

## 2025-08-14 PROCEDURE — 97530 THERAPEUTIC ACTIVITIES: CPT | Mod: PN | Performed by: PHYSICAL THERAPIST

## 2025-08-14 PROCEDURE — 97140 MANUAL THERAPY 1/> REGIONS: CPT | Mod: PN | Performed by: PHYSICAL THERAPIST

## 2025-08-14 PROCEDURE — 97110 THERAPEUTIC EXERCISES: CPT | Mod: PN | Performed by: PHYSICAL THERAPIST

## 2025-08-20 ENCOUNTER — CLINICAL SUPPORT (OUTPATIENT)
Facility: HOSPITAL | Age: 70
End: 2025-08-20
Payer: COMMERCIAL

## 2025-08-20 DIAGNOSIS — R52 PAIN AGGRAVATED BY ACTIVITIES OF DAILY LIVING: ICD-10-CM

## 2025-08-20 DIAGNOSIS — R29.898 DECREASED STRENGTH OF LOWER EXTREMITY: Primary | ICD-10-CM

## 2025-08-20 PROCEDURE — 97112 NEUROMUSCULAR REEDUCATION: CPT | Mod: PN | Performed by: PHYSICAL THERAPIST

## 2025-08-20 PROCEDURE — 97530 THERAPEUTIC ACTIVITIES: CPT | Mod: PN | Performed by: PHYSICAL THERAPIST

## 2025-08-20 PROCEDURE — 97140 MANUAL THERAPY 1/> REGIONS: CPT | Mod: PN | Performed by: PHYSICAL THERAPIST

## 2025-08-21 ENCOUNTER — CLINICAL SUPPORT (OUTPATIENT)
Facility: HOSPITAL | Age: 70
End: 2025-08-21
Payer: COMMERCIAL

## 2025-08-21 DIAGNOSIS — R52 PAIN AGGRAVATED BY ACTIVITIES OF DAILY LIVING: ICD-10-CM

## 2025-08-21 DIAGNOSIS — R29.898 DECREASED STRENGTH OF LOWER EXTREMITY: Primary | ICD-10-CM

## 2025-08-21 PROCEDURE — 97140 MANUAL THERAPY 1/> REGIONS: CPT | Mod: PN | Performed by: PHYSICAL THERAPIST

## 2025-08-21 PROCEDURE — 97110 THERAPEUTIC EXERCISES: CPT | Mod: PN | Performed by: PHYSICAL THERAPIST

## 2025-08-21 PROCEDURE — 97112 NEUROMUSCULAR REEDUCATION: CPT | Mod: PN | Performed by: PHYSICAL THERAPIST

## 2025-08-21 PROCEDURE — 97530 THERAPEUTIC ACTIVITIES: CPT | Mod: PN | Performed by: PHYSICAL THERAPIST
